# Patient Record
Sex: FEMALE | Race: BLACK OR AFRICAN AMERICAN | Employment: UNEMPLOYED | URBAN - METROPOLITAN AREA
[De-identification: names, ages, dates, MRNs, and addresses within clinical notes are randomized per-mention and may not be internally consistent; named-entity substitution may affect disease eponyms.]

---

## 2017-03-21 ENCOUNTER — TRANSFERRED RECORDS (OUTPATIENT)
Dept: HEALTH INFORMATION MANAGEMENT | Facility: CLINIC | Age: 32
End: 2017-03-21

## 2017-03-21 LAB
HPV ABSTRACT: NORMAL
PAP-ABSTRACT: NORMAL

## 2017-10-27 ENCOUNTER — APPOINTMENT (OUTPATIENT)
Dept: ULTRASOUND IMAGING | Facility: CLINIC | Age: 32
End: 2017-10-27
Attending: FAMILY MEDICINE
Payer: COMMERCIAL

## 2017-10-27 ENCOUNTER — HOSPITAL ENCOUNTER (EMERGENCY)
Facility: CLINIC | Age: 32
Discharge: HOME | End: 2017-10-27
Attending: FAMILY MEDICINE | Admitting: FAMILY MEDICINE
Payer: COMMERCIAL

## 2017-10-27 VITALS
SYSTOLIC BLOOD PRESSURE: 101 MMHG | TEMPERATURE: 98.2 F | HEART RATE: 71 BPM | BODY MASS INDEX: 31.32 KG/M2 | DIASTOLIC BLOOD PRESSURE: 68 MMHG | OXYGEN SATURATION: 99 % | RESPIRATION RATE: 16 BRPM | WEIGHT: 200 LBS

## 2017-10-27 DIAGNOSIS — O03.9 MISCARRIAGE: ICD-10-CM

## 2017-10-27 DIAGNOSIS — O03.4 RETAINED PRODUCTS OF CONCEPTION FOLLOWING ABORTION: ICD-10-CM

## 2017-10-27 LAB
B-HCG SERPL-ACNC: 2440 IU/L (ref 0–5)
BASOPHILS # BLD AUTO: 0 10E9/L (ref 0–0.2)
BASOPHILS NFR BLD AUTO: 0.4 %
DIFFERENTIAL METHOD BLD: NORMAL
EOSINOPHIL # BLD AUTO: 0.1 10E9/L (ref 0–0.7)
EOSINOPHIL NFR BLD AUTO: 1.5 %
ERYTHROCYTE [DISTWIDTH] IN BLOOD BY AUTOMATED COUNT: 13.7 % (ref 10–15)
HCG UR QL: POSITIVE
HCT VFR BLD AUTO: 36.9 % (ref 35–47)
HGB BLD-MCNC: 12.1 G/DL (ref 11.7–15.7)
IMM GRANULOCYTES # BLD: 0 10E9/L (ref 0–0.4)
IMM GRANULOCYTES NFR BLD: 0.2 %
INTERNAL QC OK POCT: YES
LYMPHOCYTES # BLD AUTO: 2.4 10E9/L (ref 0.8–5.3)
LYMPHOCYTES NFR BLD AUTO: 44 %
MCH RBC QN AUTO: 28.2 PG (ref 26.5–33)
MCHC RBC AUTO-ENTMCNC: 32.8 G/DL (ref 31.5–36.5)
MCV RBC AUTO: 86 FL (ref 78–100)
MONOCYTES # BLD AUTO: 0.3 10E9/L (ref 0–1.3)
MONOCYTES NFR BLD AUTO: 5.1 %
NEUTROPHILS # BLD AUTO: 2.7 10E9/L (ref 1.6–8.3)
NEUTROPHILS NFR BLD AUTO: 48.8 %
NRBC # BLD AUTO: 0 10*3/UL
NRBC BLD AUTO-RTO: 0 /100
PLATELET # BLD AUTO: 264 10E9/L (ref 150–450)
RBC # BLD AUTO: 4.29 10E12/L (ref 3.8–5.2)
WBC # BLD AUTO: 5.5 10E9/L (ref 4–11)

## 2017-10-27 PROCEDURE — 99284 EMERGENCY DEPT VISIT MOD MDM: CPT | Mod: Z6 | Performed by: FAMILY MEDICINE

## 2017-10-27 PROCEDURE — 84702 CHORIONIC GONADOTROPIN TEST: CPT | Performed by: FAMILY MEDICINE

## 2017-10-27 PROCEDURE — 81025 URINE PREGNANCY TEST: CPT | Performed by: FAMILY MEDICINE

## 2017-10-27 PROCEDURE — 85025 COMPLETE CBC W/AUTO DIFF WBC: CPT | Performed by: FAMILY MEDICINE

## 2017-10-27 PROCEDURE — 25000132 ZZH RX MED GY IP 250 OP 250 PS 637: Performed by: FAMILY MEDICINE

## 2017-10-27 PROCEDURE — 76801 OB US < 14 WKS SINGLE FETUS: CPT

## 2017-10-27 PROCEDURE — 99284 EMERGENCY DEPT VISIT MOD MDM: CPT | Mod: 25 | Performed by: FAMILY MEDICINE

## 2017-10-27 RX ORDER — OXYCODONE AND ACETAMINOPHEN 5; 325 MG/1; MG/1
1 TABLET ORAL ONCE
Status: COMPLETED | OUTPATIENT
Start: 2017-10-27 | End: 2017-10-27

## 2017-10-27 RX ORDER — OXYCODONE AND ACETAMINOPHEN 5; 325 MG/1; MG/1
1-2 TABLET ORAL EVERY 4 HOURS PRN
Qty: 10 TABLET | Refills: 0 | Status: SHIPPED | OUTPATIENT
Start: 2017-10-27

## 2017-10-27 RX ADMIN — OXYCODONE HYDROCHLORIDE AND ACETAMINOPHEN 1 TABLET: 5; 325 TABLET ORAL at 20:46

## 2017-10-27 ASSESSMENT — ENCOUNTER SYMPTOMS
HEADACHES: 0
EYE REDNESS: 0
COLOR CHANGE: 0
CONFUSION: 0
DIFFICULTY URINATING: 0
ARTHRALGIAS: 0
NECK STIFFNESS: 0
SHORTNESS OF BREATH: 0
FEVER: 0
ABDOMINAL PAIN: 1

## 2017-10-27 NOTE — ED PROVIDER NOTES
History     Chief Complaint   Patient presents with     Vaginal Bleeding     Onset today with vaginal bleeding, 2 months pregnant.     KHANG Madera is a 32 year old female who is  currently 2 months gestation who presents for evaluation of abdominal pain and vaginal bleeding. Patient complains of periumbilical abdominal pain as well as vaginal bleeding described as spotting that began today. She also reports some mild back pain. She denies chest pain, shortness of breath, fever, cough, or URI symptoms. No numbness or weakness. Her last pregnancy was in 2016, and she did have a baby born at that time. No history of diabetes or hypertension. She has not yet been seen or evaluated by an OB for this pregnancy, but reports she does have an appointment scheduled.     I have reviewed the Medications, Allergies, Past Medical and Surgical History, and Social History in the [employer] system.  Past Medical History:   Diagnosis Date     NO ACTIVE PROBLEMS      Pneumonia        Past Surgical History:   Procedure Laterality Date     No Surgical History         No family history on file.    Social History   Substance Use Topics     Smoking status: Never Smoker     Smokeless tobacco: Never Used     Alcohol use No       No current facility-administered medications for this encounter.      Current Outpatient Prescriptions   Medication     oxyCODONE-acetaminophen (PERCOCET) 5-325 MG per tablet     acetaminophen (TYLENOL) 500 MG tablet     polyvinyl alcohol (LIQUIFILM TEARS) 1.4 % ophthalmic solution     Prenatal Vit-Fe Fumarate-FA (PRENATAL MULTIVITAMIN  PLUS IRON) 27-0.8 MG TABS     ranitidine (ZANTAC) 150 MG tablet      No Known Allergies    Review of Systems   Constitutional: Negative for fever.   HENT: Negative for congestion.    Eyes: Negative for redness.   Respiratory: Negative for shortness of breath.    Cardiovascular: Negative for chest pain.   Gastrointestinal: Positive for abdominal pain (mild  periumbilical).   Genitourinary: Positive for vaginal bleeding (spotting x1 day). Negative for difficulty urinating.   Musculoskeletal: Negative for arthralgias and neck stiffness.   Skin: Negative for color change.   Neurological: Negative for headaches.   Psychiatric/Behavioral: Negative for confusion.   All other systems reviewed and are negative.      Physical Exam   BP: 118/62  Pulse: 87  Heart Rate: 87  Temp: 98.2  F (36.8  C)  Resp: 16  Weight: 90.7 kg (200 lb)  SpO2: 100 %      Physical Exam   Constitutional: She is oriented to person, place, and time. No distress.   HENT:   Head: Atraumatic.   Mouth/Throat: Oropharynx is clear and moist. No oropharyngeal exudate.   Eyes: Pupils are equal, round, and reactive to light. No scleral icterus.   Cardiovascular: Normal heart sounds and intact distal pulses.    Pulmonary/Chest: Breath sounds normal. No respiratory distress.   Abdominal: Soft. Bowel sounds are normal. There is no tenderness.   Genitourinary: There is breast bleeding. Uterus is enlarged and tender. Cervix exhibits no motion tenderness. Right adnexum displays no mass, no tenderness and no fullness. Left adnexum displays no mass, no tenderness and no fullness. There is bleeding in the vagina.   Musculoskeletal: She exhibits no edema or tenderness.   Neurological: She is alert and oriented to person, place, and time. She has normal reflexes.   Skin: Skin is warm. No rash noted. She is not diaphoretic.       ED Course     ED Course     Procedures   5:31 PM  The patient was seen and examined by Dr. Crandall in Room 19.          Critical Care time:  none     Labs/Imaging:    Results for orders placed or performed during the hospital encounter of 10/27/17 (from the past 24 hour(s))   CBC with platelets differential   Result Value Ref Range    WBC 5.5 4.0 - 11.0 10e9/L    RBC Count 4.29 3.8 - 5.2 10e12/L    Hemoglobin 12.1 11.7 - 15.7 g/dL    Hematocrit 36.9 35.0 - 47.0 %    MCV 86 78 - 100 fl    MCH  28.2 26.5 - 33.0 pg    MCHC 32.8 31.5 - 36.5 g/dL    RDW 13.7 10.0 - 15.0 %    Platelet Count 264 150 - 450 10e9/L    Diff Method Automated Method     % Neutrophils 48.8 %    % Lymphocytes 44.0 %    % Monocytes 5.1 %    % Eosinophils 1.5 %    % Basophils 0.4 %    % Immature Granulocytes 0.2 %    Nucleated RBCs 0 0 /100    Absolute Neutrophil 2.7 1.6 - 8.3 10e9/L    Absolute Lymphocytes 2.4 0.8 - 5.3 10e9/L    Absolute Monocytes 0.3 0.0 - 1.3 10e9/L    Absolute Eosinophils 0.1 0.0 - 0.7 10e9/L    Absolute Basophils 0.0 0.0 - 0.2 10e9/L    Abs Immature Granulocytes 0.0 0 - 0.4 10e9/L    Absolute Nucleated RBC 0.0    HCG quantitative pregnancy (blood)   Result Value Ref Range    HCG Quantitative Serum 2440 (H) 0 - 5 IU/L   hCG qual urine POCT   Result Value Ref Range    HCG Qual Urine Positive neg    Internal QC OK Yes    OB  US 1st trimester w transvag    Narrative    US OB <14 WEEKS WITH TRANSVAGINAL SINGLE 10/27/2017 6:40 PM     COMPARISON: None    HISTORY: Bleeding and cramping.    TECHNIQUE: Transabdominal ultrasound was acquired initially.  Transvaginal ultrasound was performed for better evaluation of the  products of conception.    FINDINGS: There are intrauterine products of conception including  gestational sac and embryo. The products of conception are located in  the lower aspect of the endometrial cavity. Mean crown-rump length of  the embryo is 19 mm corresponding to ultrasound gestational age of 8  weeks 3 days. There is no fetal cardiac motion or heart rate.    There is no evidence for subchorionic hemorrhage.    The right ovary is not visualized. The left ovary measures 1.9 x 2.1 x  1.0 cm. The left ovary is unremarkable.    There are no adnexal masses. There is a tiny amount of free fluid in  the cul-de-sac, minimal internal echoes possibly representing  hemorrhage.      Impression    IMPRESSION:  1. Intrauterine products of conception consisting of a gestational sac  and an embryo. The lack of  embryonic cardiac activity or an embryonic  heart rate and the location of the products of conception in the lower  uterine segment, consistent with fetal demise and AB in progress.  2. Small amount of free fluid in the cul-de-sac may represent a small  amount of hemorrhage.    TIFFANEI WEAVER MD       Assessments & Plan (with Medical Decision Making)       I have reviewed the nursing notes.    I have reviewed the findings, diagnosis, plan and need for follow up with the patient.  Patient with ongoing vaginal bleeding and ultrasound demonstrating 8 week pregnancy with no cardiac activity patient appears to be having miscarriage at this time is opting for conservative management at home she'll return if she has a marked increase in bleeding or pain.      Discharge Medication List as of 10/27/2017  9:55 PM      START taking these medications    Details   oxyCODONE-acetaminophen (PERCOCET) 5-325 MG per tablet Take 1-2 tablets by mouth every 4 hours as needed for pain, Disp-10 tablet, R-0, Local Print             Final diagnoses:   Miscarriage   I, Lisa Rios, am serving as a trained medical scribe to document services personally performed by Korey Crandall MD, based on the provider's statements to me.   I, Korey Crandall MD, was physically present and have reviewed and verified the accuracy of this note documented by Lisa Rios.      10/27/2017   [facility], [place], EMERGENCY DEPARTMENT     Korey Crandall MD  10/28/17 0018

## 2017-10-27 NOTE — ED AVS SNAPSHOT
[facility], [place], Emergency Department    [address]    [place] [address]    Phone:  [phone]    Fax:  [phone]                                       Miguel Madera   MRN: [id number]    Department:  [facility], Emergency Department   Date of Visit:  10/27/2017           After Visit Summary Signature Page     I have received my discharge instructions, and my questions have been answered. I have discussed any challenges I see with this plan with the nurse or doctor.    ..........................................................................................................................................  Patient/Patient Representative Signature      ..........................................................................................................................................  Patient Representative Print Name and Relationship to Patient    ..................................................               ................................................  Date                                            Time    ..........................................................................................................................................  Reviewed by Signature/Title    ...................................................              ..............................................  Date                                                            Time

## 2017-10-27 NOTE — ED AVS SNAPSHOT
[facility], Emergency Department    [address]    [place] [address]    Phone:  [phone]    Fax:  [phone]                                       Miguel Madera   MRN: [id number]    Department:  [facility], Emergency Department   Date of Visit:  10/27/2017           Patient Information     Date Of Birth          1985        Your diagnoses for this visit were:     Miscarriage        You were seen by Korey Crandall MD.      Follow-up Information     Follow up with [facility].    Contact information:    [address]  [facility] [address]  [phone]          Discharge Instructions         Understanding Miscarriage: During a Miscarriage  No two miscarriages are alike. Because of this, your healthcare provider will talk with you about the most appropriate treatment for you. If you re in good health and early in the pregnancy, your body may expel all the pregnancy tissue on its own. If your body doesn t expel all the tissue, your healthcare provider may recommend treatment to prevent infection and severe bleeding (hemorrhaging).  What happens during miscarriage  Some miscarriages happen without any signs or symptoms. Most miscarriages, however, start with bleeding and cramping, which may increase over time. The cramps may get very strong. This is normal when a miscarriage is happening. Cramping widens the passage (cervix) that tissue from the uterus must pass through to leave your body. Your healthcare provider may ask you for a sample of the tissue for lab testing. This is to make sure that the cells being shed from your body are normal.  Diagnosis  To confirm the miscarriage, your healthcare provider will do a pelvic exam. Your healthcare provider might order a blood test to measure the levels of a pregnancy hormone (hCG).  He or she may also have you get an ultrasound test to find out if all of the tissue has passed from the uterus. If a miscarriage  happens very early in the pregnancy, an ultrasound is not needed.  Treatment  If any tissue remains in the uterus, your healthcare provider may suggest the following measures depending on your particular situation:    Medicine. This is prescribed for you to take at home. The medicine causes the uterus to expel any remaining tissue. Take the medicine exactly as directed.    Dilation and curettage (D & C). This procedure is done in your healthcare provider s office or at the hospital. You are given medicine to prevent pain or to allow you to relax or sleep during the procedure. The healthcare provider uses instruments to widen the cervix (dilate). Tissue and blood that line the uterus are then removed (curettage).  Be sure you talk to your healthcare provider about the risks and benefits of these treatments.  If you have Rh-negative blood  If your blood is Rh negative, you may need treatment with Rho(D) immune globulin. This injection prevents substances in your blood from attacking the baby s blood during a future pregnancy. Your healthcare provider can tell you more.   Follow-up care  Keep all follow-up appointments. These are needed to make sure that you are healing well. During these visits, mention if you re feeling very sad or depressed. Your healthcare provider can suggest counseling or other resources to help you.  When to seek medical care  Contact your healthcare provider if you have any of the following:    Severe pain in the stomach, pelvis, or low back    Vaginal discharge that has a bad odor    Bleeding that soaks a new sanitary pad each hour    Fever of 100.4 F (38 C) or higher, or as directed by your healthcare provider   Date Last Reviewed: 6/1/2016 2000-2017 [employer]. [address] [place] [address], [place] [address]. All rights reserved. This information is not intended as a substitute for professional medical care. Always follow your healthcare professional's instructions.          24  Hour Appointment Hotline       To make an appointment at any [facility], call [phone] ([phone]). If you don't have a family doctor or clinic, we will help you find one. [facility] clinics are conveniently located to serve the needs of you and your family.             Review of your medicines      START taking        Dose / Directions Last dose taken    oxyCODONE-acetaminophen 5-325 MG per tablet   Commonly known as:  PERCOCET   Dose:  1-2 tablet   Quantity:  10 tablet        Take 1-2 tablets by mouth every 4 hours as needed for pain   Refills:  0          Our records show that you are taking the medicines listed below. If these are incorrect, please call your family doctor or clinic.        Dose / Directions Last dose taken    acetaminophen 500 MG tablet   Commonly known as:  TYLENOL   Dose:  500-1000 mg   Quantity:  30 tablet        Take 1-2 tablets (500-1,000 mg) by mouth every 6 hours as needed for mild pain   Refills:  0        polyvinyl alcohol 1.4 % ophthalmic solution   Commonly known as:  LIQUIFILM TEARS   Dose:  1 drop   Quantity:  15 mL        Apply 1 drop to eye as needed for dry eyes Every hour as needed for itching in the eyes   Refills:  0        prenatal multivitamin plus iron 27-0.8 MG Tabs per tablet   Dose:  1 tablet   Quantity:  60 tablet        Take 1 tablet by mouth daily   Refills:  0        ranitidine 150 MG tablet   Commonly known as:  ZANTAC   Dose:  150 mg   Quantity:  60 tablet        Take 1 tablet (150 mg) by mouth 2 times daily   Refills:  0                Prescriptions were sent or printed at these locations (1 Prescription)                   Other Prescriptions                Printed at Department/Unit printer (1 of 1)         oxyCODONE-acetaminophen (PERCOCET) 5-325 MG per tablet                Procedures and tests performed during your visit     CBC with platelets differential    HCG quantitative pregnancy (blood)    OB  US 1st trimester w transvag    Saline Lock IV  "   hCG qual urine POCT      Orders Needing Specimen Collection     None      Pending Results     No orders found from 10/25/2017 to 10/28/2017.            Pending Culture Results     No orders found from 10/25/2017 to 10/28/2017.            Pending Results Instructions     If you had any lab results that were not finalized at the time of your Discharge, you can call the ED Lab Result RN at [phone]. You will be contacted by this team for any positive Lab results or changes in treatment. The nurses are available 7 days a week from 10A to 6:30P.  You can leave a message 24 hours per day and they will return your call.        Thank you for choosing [facility]       Thank you for choosing [facility] for your care. Our goal is always to provide you with excellent care. Hearing back from our patients is one way we can continue to improve our services. Please take a few minutes to complete the written survey that you may receive in the mail after you visit with us. Thank you!        [employer] Information     [employer] lets you send messages to your doctor, view your test results, renew your prescriptions, schedule appointments and more. To sign up, go to [email or web address] . Click on \"Log in\" on the left side of the screen, which will take you to the Welcome page. Then click on \"Sign up Now\" on the right side of the page.     You will be asked to enter the access code listed below, as well as some personal information. Please follow the directions to create your username and password.     Your access code is: [email or web address]  Expires: 2018  9:54 PM     Your access code will  in 90 days. If you need help or a new code, please call your [facility] clinic or [phone].        Care EveryWhere ID     This is your Care EveryWhere ID. This could be used by other organizations to access your [facility] medical records  [id number]        Equal Access to Services     SYDNEE FINK: aniceto Wang " franchesca garza waxay idiin hayaan adeeg kharash la'aan ah. So [facility] [phone].    ATENCIÓN: Si habla colten, tiene a quiroga disposición servicios gratuitos de asistencia lingüística. Llame al [phone].    We comply with applicable federal civil rights laws and [place] laws. We do not discriminate on the basis of race, color, national origin, age, disability, sex, sexual orientation, or gender identity.            After Visit Summary       This is your record. Keep this with you and show to your community pharmacist(s) and doctor(s) at your next visit.

## 2017-10-28 NOTE — PROGRESS NOTES
GYN ED Note     Consulted by Dr. Crandall to  patient with ultrasound diagnosed missed  on her management options.  Dr. Crandall was informed of the delayed time frame of our team being able to come see the patient due to a patient in active labor with imminent delivery.  He reported that on exam the patient had minimal bleeding and her abdominal pain was improved with percocet.  Vitals and Hgb WNL.  GYN team proceeded to ED immediately following delivery.  When I arrived to the room the patient stated that she planned to go home right now and wanted to watch and wait.  She plans to eat honey and drink milk to help with the miscarriage process.  I explained to her that expectant management was a reasonable option.  I also informed her of the option of medical vs. Surgical management as well.  She voices understanding. Patient given bleeding and return precautions.  Discussed conversation with Dr. Crandall.       Mary Kay Pineda MD  OBGYN PGY3

## 2017-10-28 NOTE — DISCHARGE INSTRUCTIONS
Understanding Miscarriage: During a Miscarriage  No two miscarriages are alike. Because of this, your healthcare provider will talk with you about the most appropriate treatment for you. If you re in good health and early in the pregnancy, your body may expel all the pregnancy tissue on its own. If your body doesn t expel all the tissue, your healthcare provider may recommend treatment to prevent infection and severe bleeding (hemorrhaging).  What happens during miscarriage  Some miscarriages happen without any signs or symptoms. Most miscarriages, however, start with bleeding and cramping, which may increase over time. The cramps may get very strong. This is normal when a miscarriage is happening. Cramping widens the passage (cervix) that tissue from the uterus must pass through to leave your body. Your healthcare provider may ask you for a sample of the tissue for lab testing. This is to make sure that the cells being shed from your body are normal.  Diagnosis  To confirm the miscarriage, your healthcare provider will do a pelvic exam. Your healthcare provider might order a blood test to measure the levels of a pregnancy hormone (hCG).  He or she may also have you get an ultrasound test to find out if all of the tissue has passed from the uterus. If a miscarriage happens very early in the pregnancy, an ultrasound is not needed.  Treatment  If any tissue remains in the uterus, your healthcare provider may suggest the following measures depending on your particular situation:    Medicine. This is prescribed for you to take at home. The medicine causes the uterus to expel any remaining tissue. Take the medicine exactly as directed.    Dilation and curettage (D & C). This procedure is done in your healthcare provider s office or at the hospital. You are given medicine to prevent pain or to allow you to relax or sleep during the procedure. The healthcare provider uses instruments to widen the cervix (dilate). Tissue  and blood that line the uterus are then removed (curettage).  Be sure you talk to your healthcare provider about the risks and benefits of these treatments.  If you have Rh-negative blood  If your blood is Rh negative, you may need treatment with Rho(D) immune globulin. This injection prevents substances in your blood from attacking the baby s blood during a future pregnancy. Your healthcare provider can tell you more.   Follow-up care  Keep all follow-up appointments. These are needed to make sure that you are healing well. During these visits, mention if you re feeling very sad or depressed. Your healthcare provider can suggest counseling or other resources to help you.  When to seek medical care  Contact your healthcare provider if you have any of the following:    Severe pain in the stomach, pelvis, or low back    Vaginal discharge that has a bad odor    Bleeding that soaks a new sanitary pad each hour    Fever of 100.4 F (38 C) or higher, or as directed by your healthcare provider   Date Last Reviewed: 6/1/2016 2000-2017 [employer]. [address] [place] [address], [place] [address]. All rights reserved. This information is not intended as a substitute for professional medical care. Always follow your healthcare professional's instructions.

## 2017-11-01 ENCOUNTER — HOSPITAL ENCOUNTER (EMERGENCY)
Facility: CLINIC | Age: 32
Discharge: HOME | End: 2017-11-02
Attending: EMERGENCY MEDICINE | Admitting: EMERGENCY MEDICINE
Payer: COMMERCIAL

## 2017-11-01 DIAGNOSIS — K59.00 CONSTIPATION, UNSPECIFIED CONSTIPATION TYPE: ICD-10-CM

## 2017-11-01 DIAGNOSIS — R10.32 LLQ ABDOMINAL PAIN: ICD-10-CM

## 2017-11-01 LAB
ALBUMIN SERPL-MCNC: 3.2 G/DL (ref 3.4–5)
ALBUMIN UR-MCNC: NEGATIVE MG/DL
ALP SERPL-CCNC: 57 U/L (ref 40–150)
ALT SERPL W P-5'-P-CCNC: 14 U/L (ref 0–50)
ANION GAP SERPL CALCULATED.3IONS-SCNC: 5 MMOL/L (ref 3–14)
APPEARANCE UR: CLEAR
AST SERPL W P-5'-P-CCNC: 12 U/L (ref 0–45)
B-HCG SERPL-ACNC: 73 IU/L (ref 0–5)
BASOPHILS # BLD AUTO: 0 10E9/L (ref 0–0.2)
BASOPHILS NFR BLD AUTO: 0.2 %
BILIRUB SERPL-MCNC: 0.2 MG/DL (ref 0.2–1.3)
BILIRUB UR QL STRIP: NEGATIVE
BUN SERPL-MCNC: 14 MG/DL (ref 7–30)
CALCIUM SERPL-MCNC: 8.3 MG/DL (ref 8.5–10.1)
CHLORIDE SERPL-SCNC: 109 MMOL/L (ref 94–109)
CO2 SERPL-SCNC: 28 MMOL/L (ref 20–32)
COLOR UR AUTO: YELLOW
CREAT SERPL-MCNC: 0.55 MG/DL (ref 0.52–1.04)
DIFFERENTIAL METHOD BLD: NORMAL
EOSINOPHIL # BLD AUTO: 0.1 10E9/L (ref 0–0.7)
EOSINOPHIL NFR BLD AUTO: 1.4 %
ERYTHROCYTE [DISTWIDTH] IN BLOOD BY AUTOMATED COUNT: 13.6 % (ref 10–15)
GFR SERPL CREATININE-BSD FRML MDRD: >90 ML/MIN/1.7M2
GLUCOSE SERPL-MCNC: 86 MG/DL (ref 70–99)
GLUCOSE UR STRIP-MCNC: NEGATIVE MG/DL
HCT VFR BLD AUTO: 35.3 % (ref 35–47)
HGB BLD-MCNC: 11.7 G/DL (ref 11.7–15.7)
HGB UR QL STRIP: NEGATIVE
IMM GRANULOCYTES # BLD: 0 10E9/L (ref 0–0.4)
IMM GRANULOCYTES NFR BLD: 0.2 %
KETONES UR STRIP-MCNC: NEGATIVE MG/DL
LEUKOCYTE ESTERASE UR QL STRIP: NEGATIVE
LYMPHOCYTES # BLD AUTO: 3 10E9/L (ref 0.8–5.3)
LYMPHOCYTES NFR BLD AUTO: 48 %
MCH RBC QN AUTO: 28.9 PG (ref 26.5–33)
MCHC RBC AUTO-ENTMCNC: 33.1 G/DL (ref 31.5–36.5)
MCV RBC AUTO: 87 FL (ref 78–100)
MONOCYTES # BLD AUTO: 0.3 10E9/L (ref 0–1.3)
MONOCYTES NFR BLD AUTO: 4.7 %
MUCOUS THREADS #/AREA URNS LPF: PRESENT /LPF
NEUTROPHILS # BLD AUTO: 2.8 10E9/L (ref 1.6–8.3)
NEUTROPHILS NFR BLD AUTO: 45.5 %
NITRATE UR QL: NEGATIVE
NRBC # BLD AUTO: 0 10*3/UL
NRBC BLD AUTO-RTO: 0 /100
PH UR STRIP: 6 PH (ref 5–7)
PLATELET # BLD AUTO: 251 10E9/L (ref 150–450)
POTASSIUM SERPL-SCNC: 3.4 MMOL/L (ref 3.4–5.3)
PROT SERPL-MCNC: 7.1 G/DL (ref 6.8–8.8)
RBC # BLD AUTO: 4.05 10E12/L (ref 3.8–5.2)
RBC #/AREA URNS AUTO: 1 /HPF (ref 0–2)
SODIUM SERPL-SCNC: 142 MMOL/L (ref 133–144)
SOURCE: ABNORMAL
SP GR UR STRIP: 1.02 (ref 1–1.03)
SPECIMEN SOURCE: NORMAL
SQUAMOUS #/AREA URNS AUTO: 2 /HPF (ref 0–1)
UROBILINOGEN UR STRIP-MCNC: NORMAL MG/DL (ref 0–2)
WBC # BLD AUTO: 6.2 10E9/L (ref 4–11)
WBC #/AREA URNS AUTO: <1 /HPF (ref 0–2)
WET PREP SPEC: NORMAL

## 2017-11-01 PROCEDURE — 84702 CHORIONIC GONADOTROPIN TEST: CPT | Performed by: EMERGENCY MEDICINE

## 2017-11-01 PROCEDURE — 99284 EMERGENCY DEPT VISIT MOD MDM: CPT | Performed by: EMERGENCY MEDICINE

## 2017-11-01 PROCEDURE — 81001 URINALYSIS AUTO W/SCOPE: CPT | Performed by: EMERGENCY MEDICINE

## 2017-11-01 PROCEDURE — 87591 N.GONORRHOEAE DNA AMP PROB: CPT | Performed by: EMERGENCY MEDICINE

## 2017-11-01 PROCEDURE — 87491 CHLMYD TRACH DNA AMP PROBE: CPT | Performed by: EMERGENCY MEDICINE

## 2017-11-01 PROCEDURE — 85025 COMPLETE CBC W/AUTO DIFF WBC: CPT | Performed by: EMERGENCY MEDICINE

## 2017-11-01 PROCEDURE — 87210 SMEAR WET MOUNT SALINE/INK: CPT | Performed by: EMERGENCY MEDICINE

## 2017-11-01 PROCEDURE — 80053 COMPREHEN METABOLIC PANEL: CPT | Performed by: EMERGENCY MEDICINE

## 2017-11-01 PROCEDURE — 99284 EMERGENCY DEPT VISIT MOD MDM: CPT | Mod: Z6 | Performed by: EMERGENCY MEDICINE

## 2017-11-01 RX ORDER — ASPIRIN 81 MG
100 TABLET, DELAYED RELEASE (ENTERIC COATED) ORAL 2 TIMES DAILY PRN
Qty: 60 TABLET | Refills: 0 | Status: SHIPPED | OUTPATIENT
Start: 2017-11-01

## 2017-11-01 ASSESSMENT — ENCOUNTER SYMPTOMS
LIGHT-HEADEDNESS: 1
CONSTIPATION: 0
DIARRHEA: 0
ABDOMINAL PAIN: 1
SHORTNESS OF BREATH: 0
VOMITING: 0
DYSURIA: 0
FEVER: 0
HEMATURIA: 0

## 2017-11-01 NOTE — ED AVS SNAPSHOT
[facility], [place], Emergency Department    [address]    [facility] [place] [place] [address]    Phone:  [phone]    Fax:  [phone]                                       Miguel Madera   MRN: [id number]    Department:  [facility], Emergency Department   Date of Visit:  11/1/2017           After Visit Summary Signature Page     I have received my discharge instructions, and my questions have been answered. I have discussed any challenges I see with this plan with the nurse or doctor.    ..........................................................................................................................................  Patient/Patient Representative Signature      ..........................................................................................................................................  Patient Representative Print Name and Relationship to Patient    ..................................................               ................................................  Date                                            Time    ..........................................................................................................................................  Reviewed by Signature/Title    ...................................................              ..............................................  Date                                                            Time

## 2017-11-01 NOTE — ED AVS SNAPSHOT
[facility], Emergency Department    [address]    [place] [address]    Phone:  [phone]    Fax:  [phone]                                       Miguel Madera   MRN: [id number]    Department:  [facility] Emergency Department   Date of Visit:  11/1/2017           Patient Information     Date Of Birth          1985        Your diagnoses for this visit were:     LLQ abdominal pain     Constipation, unspecified constipation type        You were seen by Dhaval Reyna MD.        Discharge Instructions         Constipation (Adult)  Constipation means that you have bowel movements that are less frequent than usual. Stools often become very hard and difficult to pass.  Constipation is very common. At some point in life it affects almost everyone. Since everyone's bowel habits are different, what is constipation to one person may not be to another. Your healthcare provider may do tests to diagnose constipation. It depends on what he or she finds when evaluating you.    Symptoms of constipation include:    Abdominal pain    Bloating    Vomiting    Painful bowel movements    Itching, swelling, bleeding, or pain around the anus  Causes  Constipation can have many causes. These include:    Diet low in fiber    Too much dairy    Not drinking enough liquids    Lack of exercise or physical activity. This is especially true for older adults.    Changes in lifestyle or daily routine, including pregnancy, aging, work, and travel    Frequent use or misuse of laxatives    Ignoring the urge to have a bowel movement or delaying it until later    Medicines, such as certain prescription pain medicines, iron supplements, antacids, certain antidepressants, and calcium supplements    Diseases like irritable bowel syndrome, bowel obstructions, stroke, diabetes, thyroid disease, Parkinson disease, hemorrhoids, and colon cancer  Complications  Potential complications of constipation can  include:    Hemorrhoids    Rectal bleeding from hemorrhoids or anal fissures (skin tears)    Hernias    Dependency on laxatives    Chronic constipation    Fecal impaction    Bowel obstruction or perforation  Home care  All treatment should be done after talking with your healthcare provider. This is especially true if you have another medical problems, are taking prescription medicines, or are an older adult. Treatment most often involves lifestyle changes. You may also need medicines. Your healthcare provider will tell you which will work best for you. Follow the advice below to help avoid this problem in the future.  Lifestyle changes  These lifestyle changes can help prevent constipation:    Diet. Eat a high-fiber diet, with fresh fruit and vegetables, and reduce dairy intake, meats, and processed foods    Fluids. It's important to get enough fluids each day. Drink plenty of water when you eat more fiber. If you are on diet that limits the amount of fluid you can have, talk about this with your healthcare provider.    Regular exercise. Check with your healthcare provider first.  Medications  Take any medicines as directed. Some laxatives are safe to use only every now and then. Others can be taken on a regular basis. Talk with your doctor or pharmacist if you have questions.  Prescription pain medicines can cause constipation. If you are taking this kind of medicine, ask your healthcare provider if you should also take a stool softener.  Medicines you may take to treat constipation include:    Fiber supplements    Stool softeners    Laxatives    Enemas    Rectal suppositories  Follow-up care  Follow up with your healthcare provider if symptoms don't get better in the next few days. You may need to have more tests or see a specialist.  Call 911  Call 911 if any of these occur:    Trouble breathing    Stiff, rigid abdomen that is severely painful to touch    Confusion    Fainting or loss of consciousness    Rapid  heart rate    Chest pain  When to seek medical advice  Call your healthcare provider right away if any of these occur:    Fever over 100.4 F (38 C)    Failure to resume normal bowel movements    Pain in your abdomen or back gets worse    Nausea or vomiting    Swelling in your abdomen    Blood in the stool    Black, tarry stool    Involuntary weight loss    Weakness  Date Last Reviewed: 12/30/2015 2000-2017 [employer]. [address] [place] [address]. All rights reserved. This information is not intended as a substitute for professional medical care. Always follow your healthcare professional's instructions.      Take colace and citrucel as directed.  Please make an appointment to follow up with Your Primary Care Provider in 1 week.     24 Hour Appointment Hotline       To make an appointment at any [facility], call [phone] ([phone]). If you don't have a family doctor or clinic, we will help you find one. [place] clinics are conveniently located to serve the needs of you and your family.             Review of your medicines      START taking        Dose / Directions Last dose taken    docusate sodium 100 MG tablet   Commonly known as:  COLACE   Dose:  100 mg   Quantity:  60 tablet        Take 100 mg by mouth 2 times daily as needed for constipation   Refills:  0        methylcellulose 500 MG Tabs tablet   Commonly known as:  CITRUCEL   Dose:  500 mg   Quantity:  14 each        Take 1 tablet (500 mg) by mouth daily   Refills:  0          Our records show that you are taking the medicines listed below. If these are incorrect, please call your family doctor or clinic.        Dose / Directions Last dose taken    acetaminophen 500 MG tablet   Commonly known as:  TYLENOL   Dose:  500-1000 mg   Quantity:  30 tablet        Take 1-2 tablets (500-1,000 mg) by mouth every 6 hours as needed for mild pain   Refills:  0        oxyCODONE-acetaminophen 5-325 MG per tablet   Commonly  known as:  PERCOCET   Dose:  1-2 tablet   Quantity:  10 tablet        Take 1-2 tablets by mouth every 4 hours as needed for pain   Refills:  0        polyvinyl alcohol 1.4 % ophthalmic solution   Commonly known as:  LIQUIFILM TEARS   Dose:  1 drop   Quantity:  15 mL        Apply 1 drop to eye as needed for dry eyes Every hour as needed for itching in the eyes   Refills:  0        prenatal multivitamin plus iron 27-0.8 MG Tabs per tablet   Dose:  1 tablet   Quantity:  60 tablet        Take 1 tablet by mouth daily   Refills:  0        ranitidine 150 MG tablet   Commonly known as:  ZANTAC   Dose:  150 mg   Quantity:  60 tablet        Take 1 tablet (150 mg) by mouth 2 times daily   Refills:  0                Prescriptions were sent or printed at these locations (2 Prescriptions)                   Other Prescriptions                Printed at Department/Unit printer (2 of 2)         docusate sodium (COLACE) 100 MG tablet               methylcellulose (CITRUCEL) 500 MG TABS tablet                Procedures and tests performed during your visit     CBC with platelets differential    Chlamydia trachomatis PCR    Comprehensive metabolic panel    HCG quantitative pregnancy (blood)    Neisseria gonorrhoea PCR    Peripheral IV catheter    Prep for procedure - pelvic exam    UA with Microscopic reflex to Culture    Wet prep      Orders Needing Specimen Collection     None      Pending Results     Date and Time Order Name Status Description    11/1/2017 2212 Neisseria gonorrhoea PCR In process     11/1/2017 2212 Chlamydia trachomatis PCR In process             Pending Culture Results     Date and Time Order Name Status Description    11/1/2017 2212 Neisseria gonorrhoea PCR In process     11/1/2017 2212 Chlamydia trachomatis PCR In process             Pending Results Instructions     If you had any lab results that were not finalized at the time of your Discharge, you can call the ED Lab Result RN at [phone]. You will be  "contacted by this team for any positive Lab results or changes in treatment. The nurses are available 7 days a week from 10A to 6:30P.  You can leave a message 24 hours per day and they will return your call.        Thank you for choosing [facility]       Thank you for choosing [facility] for your care. Our goal is always to provide you with excellent care. Hearing back from our patients is one way we can continue to improve our services. Please take a few minutes to complete the written survey that you may receive in the mail after you visit with us. Thank you!        [employer] Information     [employer] lets you send messages to your doctor, view your test results, renew your prescriptions, schedule appointments and more. To sign up, go to [email or web address] . Click on \"Log in\" on the left side of the screen, which will take you to the Welcome page. Then click on \"Sign up Now\" on the right side of the page.     You will be asked to enter the access code listed below, as well as some personal information. Please follow the directions to create your username and password.     Your access code is: [email or web address]  Expires: 2018  9:54 PM     Your access code will  in 90 days. If you need help or a new code, please call your [facility] clinic or [phone].        Care EveryWhere ID     This is your Care EveryWhere ID. This could be used by other organizations to access your [facility] medical records  [id number]        Equal Access to Services     SYDNEE TATE AH: Hadii logan Sood, waaxda luqadaha, qaybta kaalmada binh, leah lacy . So [facility] [phone].    ATENCIÓN: Si habla español, tiene a quiroga disposición servicios gratuitos de asistencia lingüística. Llame al [phone].    We comply with applicable federal civil rights laws and [place] laws. We do not discriminate on the basis of race, color, national origin, age, disability, sex, sexual orientation, or gender " identity.            After Visit Summary       This is your record. Keep this with you and show to your community pharmacist(s) and doctor(s) at your next visit.

## 2017-11-02 VITALS
WEIGHT: 199.96 LBS | DIASTOLIC BLOOD PRESSURE: 70 MMHG | OXYGEN SATURATION: 100 % | BODY MASS INDEX: 31.38 KG/M2 | RESPIRATION RATE: 16 BRPM | HEIGHT: 67 IN | TEMPERATURE: 98.3 F | SYSTOLIC BLOOD PRESSURE: 105 MMHG

## 2017-11-02 LAB
C TRACH DNA SPEC QL NAA+PROBE: NEGATIVE
N GONORRHOEA DNA SPEC QL NAA+PROBE: NEGATIVE
SPECIMEN SOURCE: NORMAL
SPECIMEN SOURCE: NORMAL

## 2017-11-02 NOTE — DISCHARGE INSTRUCTIONS

## 2017-11-02 NOTE — ED PROVIDER NOTES
History     Chief Complaint   Patient presents with     Abdominal Pain     C/O LLQ pain. Pt states she has miscarriage.     KHANG Madera is a 32 year old female, , who presents to the ER today for evaluation of abdominal pain. The patient was seen in this ER on 10/27, 5 days ago, for evaluation of vaginal bleeding in early pregnancy. She underwent transvaginal ultrasound that demonstrated 8 week pregnancy with no cardiac activity. Symptoms at that time were felt consistent with active miscarriage. The patient reports that she did pass a significant amount of blood and presumed tissue material after discharged to home the night after her recent discharge. She had associated cramping abdominal pain that largely resolved; however, she has since had persisting, intermittent left lower quadrant pain. The patient reports that the pain has been intermittent, waxing and waning. She states that she's been trying oxycodone with limited effect in her pain. The patient identifies a localized area in the subcutaneous tissue in the left lower quadrant as the area of symptoms.  The patient states that vaginal bleeding has been very light over the past 2 days, noticed only when urinating. The patient reports some lightheadedness. She denies any vertiginous-type symptoms or any chest pain or shortness of breath. She also denies any fevers or chills. The patient also denies any constipation or diarrhea and states bowel movements have been regular. She denies recent falls or other trauma. The patient has no other complaints at this time.     No current facility-administered medications for this encounter.      Current Outpatient Prescriptions   Medication     docusate sodium (COLACE) 100 MG tablet     methylcellulose (CITRUCEL) 500 MG TABS tablet     oxyCODONE-acetaminophen (PERCOCET) 5-325 MG per tablet     acetaminophen (TYLENOL) 500 MG tablet     polyvinyl alcohol (LIQUIFILM TEARS) 1.4 % ophthalmic solution      "Prenatal Vit-Fe Fumarate-FA (PRENATAL MULTIVITAMIN  PLUS IRON) 27-0.8 MG TABS     ranitidine (ZANTAC) 150 MG tablet     Past Medical History:   Diagnosis Date     NO ACTIVE PROBLEMS      Pneumonia        Past Surgical History:   Procedure Laterality Date     No Surgical History         No family history on file.    Social History   Substance Use Topics     Smoking status: Never Smoker     Smokeless tobacco: Never Used     Alcohol use No      No Known Allergies    I have reviewed the Medications, Allergies, Past Medical and Surgical History, and Social History in the Epic system.    Review of Systems   Constitutional: Negative for fever.   Respiratory: Negative for shortness of breath.    Cardiovascular: Negative for chest pain.   Gastrointestinal: Positive for abdominal pain (LLQ). Negative for constipation, diarrhea and vomiting.   Genitourinary: Positive for vaginal bleeding (very mild). Negative for dysuria, hematuria and vaginal discharge.   Neurological: Positive for light-headedness (mild).   All other systems reviewed and are negative.      Physical Exam   BP: 98/64  Heart Rate: 92  Temp: 97.8  F (36.6  C)  Resp: 16  Height: 170.2 cm (5' 7\")  Weight: 90.7 kg (199 lb 15.3 oz)  SpO2: 98 %      Physical Exam   Constitutional: She appears well-developed and well-nourished. No distress.   HENT:   Head: Normocephalic and atraumatic.   Mouth/Throat: Oropharynx is clear and moist. No oropharyngeal exudate.   Eyes: Pupils are equal, round, and reactive to light. No scleral icterus.   Cardiovascular: Normal rate, regular rhythm, normal heart sounds and intact distal pulses.    Pulmonary/Chest: Effort normal and breath sounds normal. No respiratory distress.   Abdominal: Soft. Bowel sounds are normal. There is no tenderness.       Genitourinary: Uterus normal. Uterus is not enlarged and not tender. Cervix exhibits no motion tenderness. Right adnexum displays no mass and no tenderness. Left adnexum displays no mass and " no tenderness. There is bleeding (trace) in the vagina.   Musculoskeletal: Normal range of motion. She exhibits no edema or tenderness.   Skin: Skin is warm. No rash noted. She is not diaphoretic.   Nursing note and vitals reviewed.      ED Course     ED Course     Procedures            Critical Care time:    Results for orders placed or performed during the hospital encounter of 11/01/17   CBC with platelets differential   Result Value Ref Range    WBC 6.2 4.0 - 11.0 10e9/L    RBC Count 4.05 3.8 - 5.2 10e12/L    Hemoglobin 11.7 11.7 - 15.7 g/dL    Hematocrit 35.3 35.0 - 47.0 %    MCV 87 78 - 100 fl    MCH 28.9 26.5 - 33.0 pg    MCHC 33.1 31.5 - 36.5 g/dL    RDW 13.6 10.0 - 15.0 %    Platelet Count 251 150 - 450 10e9/L    Diff Method Automated Method     % Neutrophils 45.5 %    % Lymphocytes 48.0 %    % Monocytes 4.7 %    % Eosinophils 1.4 %    % Basophils 0.2 %    % Immature Granulocytes 0.2 %    Nucleated RBCs 0 0 /100    Absolute Neutrophil 2.8 1.6 - 8.3 10e9/L    Absolute Lymphocytes 3.0 0.8 - 5.3 10e9/L    Absolute Monocytes 0.3 0.0 - 1.3 10e9/L    Absolute Eosinophils 0.1 0.0 - 0.7 10e9/L    Absolute Basophils 0.0 0.0 - 0.2 10e9/L    Abs Immature Granulocytes 0.0 0 - 0.4 10e9/L    Absolute Nucleated RBC 0.0    Comprehensive metabolic panel   Result Value Ref Range    Sodium 142 133 - 144 mmol/L    Potassium 3.4 3.4 - 5.3 mmol/L    Chloride 109 94 - 109 mmol/L    Carbon Dioxide 28 20 - 32 mmol/L    Anion Gap 5 3 - 14 mmol/L    Glucose 86 70 - 99 mg/dL    Urea Nitrogen 14 7 - 30 mg/dL    Creatinine 0.55 0.52 - 1.04 mg/dL    GFR Estimate >90 >60 mL/min/1.7m2    GFR Estimate If Black >90 >60 mL/min/1.7m2    Calcium 8.3 (L) 8.5 - 10.1 mg/dL    Bilirubin Total 0.2 0.2 - 1.3 mg/dL    Albumin 3.2 (L) 3.4 - 5.0 g/dL    Protein Total 7.1 6.8 - 8.8 g/dL    Alkaline Phosphatase 57 40 - 150 U/L    ALT 14 0 - 50 U/L    AST 12 0 - 45 U/L   HCG quantitative pregnancy (blood)   Result Value Ref Range    HCG Quantitative Serum  73 (H) 0 - 5 IU/L   UA with Microscopic reflex to Culture   Result Value Ref Range    Color Urine Yellow     Appearance Urine Clear     Glucose Urine Negative NEG^Negative mg/dL    Bilirubin Urine Negative NEG^Negative    Ketones Urine Negative NEG^Negative mg/dL    Specific Gravity Urine 1.022 1.003 - 1.035    Blood Urine Negative NEG^Negative    pH Urine 6.0 5.0 - 7.0 pH    Protein Albumin Urine Negative NEG^Negative mg/dL    Urobilinogen mg/dL Normal 0.0 - 2.0 mg/dL    Nitrite Urine Negative NEG^Negative    Leukocyte Esterase Urine Negative NEG^Negative    Source Unspecified Urine     WBC Urine <1 0 - 2 /HPF    RBC Urine 1 0 - 2 /HPF    Squamous Epithelial /HPF Urine 2 (H) 0 - 1 /HPF    Mucous Urine Present (A) NEG^Negative /LPF   Wet prep   Result Value Ref Range    Specimen Description Cervix     Wet Prep Few  PMNs seen       Wet Prep No Trichomonas seen     Wet Prep No clue cells seen     Wet Prep No yeast seen                Assessments & Plan (with Medical Decision Making)   32 year old female 5 days status post spontaneous  at 8 weeks gestation to the emergency department with intermittent left lower quadrant abdominal pain.  The patient also provides a history of constipation.  She has been on Percocet during her miscarriage.  In the emergency department, the patient has normal vital signs.  She appears clinically well.  Her abdomen is soft and nontender.  She localizes a small subcutaneous nodule in the left lower quadrant as her area of pain.  Her skin appears normal in the area.  There is no fluctuance or significant palpable abnormality.  The patient's pelvic examination is normal.  I do not suspect retained products of conception in light of her normal abdominal and pelvic exam in the absence of significant vaginal bleeding.  Patient has normal labs including CBC.  Her urine does not appear infected.  Circumference metabolic panel is normal as well.  GC and chlamydia are pending.  The patient  appears clinically well and appropriate for outpatient management.  My clinical suspicion for ovarian pathology is low in light of her normal pelvic exam.  Additionally, suspicion for diverticulitis or other form of colitis low as well.  Suspect constipation is playing a role in her symptoms.  We'll discharge home with Colace and Citrucel.  Primary care follow-up recommended.    I have reviewed the nursing notes.    I have reviewed the findings, diagnosis, plan and need for follow up with the patient.    New Prescriptions    DOCUSATE SODIUM (COLACE) 100 MG TABLET    Take 100 mg by mouth 2 times daily as needed for constipation    METHYLCELLULOSE (CITRUCEL) 500 MG TABS TABLET    Take 1 tablet (500 mg) by mouth daily       Final diagnoses:   LLQ abdominal pain   Constipation, unspecified constipation type     I, Daniel Lopez, am serving as a trained medical scribe to document services personally performed by Dhaval Reyna MD, based on the provider's statements to me.      I, Dhaval Reyna MD, was physically present and have reviewed and verified the accuracy of this note documented by Daniel Lopez.    11/1/2017   [facility], EMERGENCY DEPARTMENT     Dhaval Reyna MD  11/01/17 9291

## 2018-01-14 ENCOUNTER — HOSPITAL ENCOUNTER (EMERGENCY)
Facility: CLINIC | Age: 33
Discharge: HOME | End: 2018-01-14
Attending: EMERGENCY MEDICINE | Admitting: EMERGENCY MEDICINE
Payer: COMMERCIAL

## 2018-01-14 VITALS
WEIGHT: 204.4 LBS | OXYGEN SATURATION: 100 % | TEMPERATURE: 98.7 F | RESPIRATION RATE: 18 BRPM | SYSTOLIC BLOOD PRESSURE: 97 MMHG | BODY MASS INDEX: 32.08 KG/M2 | HEIGHT: 67 IN | DIASTOLIC BLOOD PRESSURE: 64 MMHG

## 2018-01-14 DIAGNOSIS — R11.2 NON-INTRACTABLE VOMITING WITH NAUSEA, UNSPECIFIED VOMITING TYPE: ICD-10-CM

## 2018-01-14 DIAGNOSIS — K21.9 GASTROESOPHAGEAL REFLUX DISEASE, ESOPHAGITIS PRESENCE NOT SPECIFIED: ICD-10-CM

## 2018-01-14 DIAGNOSIS — Z33.1 PREGNANCY, INCIDENTAL: ICD-10-CM

## 2018-01-14 LAB
ALBUMIN UR-MCNC: 10 MG/DL
ANION GAP SERPL CALCULATED.3IONS-SCNC: 6 MMOL/L (ref 3–14)
APPEARANCE UR: CLEAR
BASOPHILS # BLD AUTO: 0 10E9/L (ref 0–0.2)
BASOPHILS NFR BLD AUTO: 0.2 %
BILIRUB UR QL STRIP: NEGATIVE
BUN SERPL-MCNC: 8 MG/DL (ref 7–30)
CALCIUM SERPL-MCNC: 8.6 MG/DL (ref 8.5–10.1)
CAOX CRY #/AREA URNS HPF: ABNORMAL /HPF
CHLORIDE SERPL-SCNC: 106 MMOL/L (ref 94–109)
CO2 SERPL-SCNC: 26 MMOL/L (ref 20–32)
COLOR UR AUTO: YELLOW
CREAT SERPL-MCNC: 0.44 MG/DL (ref 0.52–1.04)
DIFFERENTIAL METHOD BLD: NORMAL
EOSINOPHIL # BLD AUTO: 0 10E9/L (ref 0–0.7)
EOSINOPHIL NFR BLD AUTO: 0.7 %
ERYTHROCYTE [DISTWIDTH] IN BLOOD BY AUTOMATED COUNT: 13.7 % (ref 10–15)
GFR SERPL CREATININE-BSD FRML MDRD: >90 ML/MIN/1.7M2
GLUCOSE SERPL-MCNC: 95 MG/DL (ref 70–99)
GLUCOSE UR STRIP-MCNC: NEGATIVE MG/DL
HCG UR QL: POSITIVE
HCT VFR BLD AUTO: 38.7 % (ref 35–47)
HGB BLD-MCNC: 13 G/DL (ref 11.7–15.7)
HGB UR QL STRIP: NEGATIVE
IMM GRANULOCYTES # BLD: 0 10E9/L (ref 0–0.4)
IMM GRANULOCYTES NFR BLD: 0 %
INTERNAL QC OK POCT: YES
KETONES UR STRIP-MCNC: NEGATIVE MG/DL
LEUKOCYTE ESTERASE UR QL STRIP: NEGATIVE
LYMPHOCYTES # BLD AUTO: 2.4 10E9/L (ref 0.8–5.3)
LYMPHOCYTES NFR BLD AUTO: 40.6 %
MCH RBC QN AUTO: 29.1 PG (ref 26.5–33)
MCHC RBC AUTO-ENTMCNC: 33.6 G/DL (ref 31.5–36.5)
MCV RBC AUTO: 87 FL (ref 78–100)
MONOCYTES # BLD AUTO: 0.3 10E9/L (ref 0–1.3)
MONOCYTES NFR BLD AUTO: 5.6 %
MUCOUS THREADS #/AREA URNS LPF: PRESENT /LPF
NEUTROPHILS # BLD AUTO: 3.1 10E9/L (ref 1.6–8.3)
NEUTROPHILS NFR BLD AUTO: 52.9 %
NITRATE UR QL: NEGATIVE
NRBC # BLD AUTO: 0 10*3/UL
NRBC BLD AUTO-RTO: 0 /100
PH UR STRIP: 5.5 PH (ref 5–7)
PLATELET # BLD AUTO: 220 10E9/L (ref 150–450)
POTASSIUM SERPL-SCNC: 3.3 MMOL/L (ref 3.4–5.3)
RBC # BLD AUTO: 4.46 10E12/L (ref 3.8–5.2)
RBC #/AREA URNS AUTO: 1 /HPF (ref 0–2)
SODIUM SERPL-SCNC: 138 MMOL/L (ref 133–144)
SOURCE: ABNORMAL
SP GR UR STRIP: 1.02 (ref 1–1.03)
SQUAMOUS #/AREA URNS AUTO: 6 /HPF (ref 0–1)
UROBILINOGEN UR STRIP-MCNC: NORMAL MG/DL (ref 0–2)
WBC # BLD AUTO: 5.9 10E9/L (ref 4–11)
WBC #/AREA URNS AUTO: 1 /HPF (ref 0–2)

## 2018-01-14 PROCEDURE — 99284 EMERGENCY DEPT VISIT MOD MDM: CPT | Mod: 25 | Performed by: EMERGENCY MEDICINE

## 2018-01-14 PROCEDURE — 25000128 H RX IP 250 OP 636: Performed by: EMERGENCY MEDICINE

## 2018-01-14 PROCEDURE — 80048 BASIC METABOLIC PNL TOTAL CA: CPT | Performed by: EMERGENCY MEDICINE

## 2018-01-14 PROCEDURE — 85025 COMPLETE CBC W/AUTO DIFF WBC: CPT | Performed by: EMERGENCY MEDICINE

## 2018-01-14 PROCEDURE — 25000125 ZZHC RX 250: Performed by: EMERGENCY MEDICINE

## 2018-01-14 PROCEDURE — 25000132 ZZH RX MED GY IP 250 OP 250 PS 637: Performed by: EMERGENCY MEDICINE

## 2018-01-14 PROCEDURE — 96374 THER/PROPH/DIAG INJ IV PUSH: CPT | Performed by: EMERGENCY MEDICINE

## 2018-01-14 PROCEDURE — 96361 HYDRATE IV INFUSION ADD-ON: CPT | Performed by: EMERGENCY MEDICINE

## 2018-01-14 PROCEDURE — 81001 URINALYSIS AUTO W/SCOPE: CPT | Performed by: EMERGENCY MEDICINE

## 2018-01-14 PROCEDURE — 81025 URINE PREGNANCY TEST: CPT | Performed by: EMERGENCY MEDICINE

## 2018-01-14 PROCEDURE — 99284 EMERGENCY DEPT VISIT MOD MDM: CPT | Mod: Z6 | Performed by: EMERGENCY MEDICINE

## 2018-01-14 RX ORDER — ONDANSETRON 2 MG/ML
8 INJECTION INTRAMUSCULAR; INTRAVENOUS ONCE
Status: COMPLETED | OUTPATIENT
Start: 2018-01-14 | End: 2018-01-14

## 2018-01-14 RX ORDER — POTASSIUM CHLORIDE 750 MG/1
40 TABLET, EXTENDED RELEASE ORAL ONCE
Status: COMPLETED | OUTPATIENT
Start: 2018-01-14 | End: 2018-01-14

## 2018-01-14 RX ORDER — ACETAMINOPHEN 500 MG
500-1000 TABLET ORAL EVERY 6 HOURS PRN
Qty: 30 TABLET | Refills: 0 | Status: SHIPPED | OUTPATIENT
Start: 2018-01-14 | End: 2018-01-22

## 2018-01-14 RX ORDER — ONDANSETRON 8 MG/1
8 TABLET, ORALLY DISINTEGRATING ORAL EVERY 8 HOURS PRN
Qty: 12 TABLET | Refills: 0 | Status: SHIPPED | OUTPATIENT
Start: 2018-01-14 | End: 2018-01-17

## 2018-01-14 RX ADMIN — POTASSIUM CHLORIDE 40 MEQ: 750 TABLET, FILM COATED, EXTENDED RELEASE ORAL at 13:43

## 2018-01-14 RX ADMIN — LIDOCAINE HYDROCHLORIDE: 20 SOLUTION ORAL; TOPICAL at 12:37

## 2018-01-14 RX ADMIN — SODIUM CHLORIDE 1000 ML: 9 INJECTION, SOLUTION INTRAVENOUS at 12:25

## 2018-01-14 RX ADMIN — ONDANSETRON 8 MG: 2 INJECTION INTRAMUSCULAR; INTRAVENOUS at 12:33

## 2018-01-14 ASSESSMENT — ENCOUNTER SYMPTOMS
DYSURIA: 0
VOMITING: 1
SHORTNESS OF BREATH: 0
NAUSEA: 1
FEVER: 0
ABDOMINAL PAIN: 0

## 2018-01-14 NOTE — ED AVS SNAPSHOT
[facility], [place], Emergency Department    [address]    [place] [address]    Phone:  [phone]    Fax:  [phone]                                       Miguel Madera   MRN: [id number]    Department:  [facility], Emergency Department   Date of Visit:  1/14/2018           After Visit Summary Signature Page     I have received my discharge instructions, and my questions have been answered. I have discussed any challenges I see with this plan with the nurse or doctor.    ..........................................................................................................................................  Patient/Patient Representative Signature      ..........................................................................................................................................  Patient Representative Print Name and Relationship to Patient    ..................................................               ................................................  Date                                            Time    ..........................................................................................................................................  Reviewed by Signature/Title    ...................................................              ..............................................  Date                                                            Time

## 2018-01-14 NOTE — ED PROVIDER NOTES
"  History     Chief Complaint   Patient presents with     Nausea & Vomiting     Pt had miscarriage end of october, had 1 menstrual cycle since (beg. )- took at home pregnancy test 2 weeks ago and was positive. Experiencing n/v & heartburn. Pt reports nightly \"fevers\"      HPI  Miguel Madera is a 33 year old  female who presents with nausea and vomiting. The patient was seen in the ED at the end of October for an active miscarriage. She states she has had one menstrual period since that miscarriage; however, she states she took an at home pregnancy test approximately two weeks ago that was positive.  Last menstrual period was at the beginning of December.  She presents today with one week of nausea and vomiting. She denies any abdominal pain, shortness of breath, or chest pain. She also notes \"gas\" and \"burning\" symptoms in the epigastrium when she lies down that is causing her discomfort, for which she states she has taken antacids in the past.  She denies dysuria, hematuria, vaginal discharge or bleeding.    Past Medical History:   Diagnosis Date     NO ACTIVE PROBLEMS      Pneumonia        Past Surgical History:   Procedure Laterality Date     No Surgical History         No family history on file.    Social History   Substance Use Topics     Smoking status: Never Smoker     Smokeless tobacco: Never Used     Alcohol use No       No current facility-administered medications for this encounter.      Current Outpatient Prescriptions   Medication     ranitidine (ZANTAC) 150 MG tablet     ondansetron (ZOFRAN ODT) 8 MG ODT tab     acetaminophen (TYLENOL) 500 MG tablet     docusate sodium (COLACE) 100 MG tablet     methylcellulose (CITRUCEL) 500 MG TABS tablet     oxyCODONE-acetaminophen (PERCOCET) 5-325 MG per tablet     acetaminophen (TYLENOL) 500 MG tablet     polyvinyl alcohol (LIQUIFILM TEARS) 1.4 % ophthalmic solution     Prenatal Vit-Fe Fumarate-FA (PRENATAL MULTIVITAMIN  PLUS IRON) 27-0.8 MG " "TABS      No Known Allergies    I have reviewed the Medications, Allergies, Past Medical and Surgical History, and Social History in the Epic system.    Review of Systems   Constitutional: Negative for fever.   Respiratory: Negative for shortness of breath.    Cardiovascular: Positive for chest pain (\"burning when lying supine\").   Gastrointestinal: Positive for nausea and vomiting. Negative for abdominal pain.   Genitourinary: Negative for dysuria and vaginal bleeding.   All other systems reviewed and are negative.      Physical Exam   BP: 103/67  Heart Rate: 60  Temp: 98.7  F (37.1  C)  Resp: 16  Height: 170.2 cm (5' 7\")  Weight: 92.7 kg (204 lb 6.4 oz)  SpO2: 99 %      Physical Exam    GEN:  Alert, well developed, no acute distress  HEENT:  PERRL, EOMI, Mucous membranes are moist.   Cardio:  RRR, no murmur, radial pulses equal bilaterally  PULM:  Lungs clear, good air movement, no wheezes, rales   Abd:  Soft, normal bowel sounds, no focal tenderness  Back exam:  No CVA tenderness  Musculoskeletal:  normal range of motion, no lower extremity swelling or calf tenderness  Neuro:  Alert and oriented X3, Follows commands, moving all extremities spontaneously   Skin:  Warm, dry    ED Course     ED Course     Procedures             Critical Care time:  None  She was given IV fluids for dehydration and IV Zofran for nausea.  Patient was also given a GI cocktail and had improvement in her symptoms.  Labs are normal except as shown.  Urine is a contaminated sample.  UPT is positive.  She was given potassium replacement in the emergency department.  Patient states that her potassium levels are \"always low\"    Labs Ordered and Resulted from Time of ED Arrival Up to the Time of Departure from the ED   BASIC METABOLIC PANEL - Abnormal; Notable for the following:        Result Value    Potassium 3.3 (*)     Creatinine 0.44 (*)     All other components within normal limits   ROUTINE UA WITH MICROSCOPIC REFLEX TO CULTURE - " Abnormal; Notable for the following:     Protein Albumin Urine 10 (*)     Squamous Epithelial /HPF Urine 6 (*)     Mucous Urine Present (*)     Calcium Oxalate Few (*)     All other components within normal limits   HCG QUAL URINE POCT - Abnormal; Notable for the following:    CBC WITH PLATELETS DIFFERENTIAL            Assessments & Plan (with Medical Decision Making)   Patient presents with nausea and vomiting for the past week.  She has a positive pregnancy test, but no abdominal pain, vaginal bleeding, leakage of fluid.  She does report some epigastric burning sensation radiating into the chest, which is likely GERD.  Patient was given prescriptions for her symptoms as shown below.  Patient's nausea vomiting is likely due to her pregnancy.  There is no sign of UTI, miscarriage, ectopic pregnancy, bowel obstruction or surgical abdomen.  She was advised to follow-up with her primary care provider or OB provider in the next few days.  She should return the emergency department with worsening symptoms.    I have reviewed the nursing notes.    I have reviewed the findings, diagnosis, plan and need for follow up with the patient.    Discharge Medication List as of 1/14/2018  2:28 PM      START taking these medications    Details   ondansetron (ZOFRAN ODT) 8 MG ODT tab Take 1 tablet (8 mg) by mouth every 8 hours as needed for nausea, Disp-12 tablet, R-0, Local Print      !! acetaminophen (TYLENOL) 500 MG tablet Take 1-2 tablets (500-1,000 mg) by mouth every 6 hours as needed for mild pain, Disp-30 tablet, R-0, Local Print       !! - Potential duplicate medications found. Please discuss with provider.          Final diagnoses:   Non-intractable vomiting with nausea, unspecified vomiting type   Gastroesophageal reflux disease, esophagitis presence not specified   Pregnancy, incidental   I, Kalpesh Olivas, am serving as a trained medical scribe to document services personally performed by Naa Bartholomew MD, based on the  provider's statements to me.      I, Naa Bartholomew MD, was physically present and have reviewed and verified the accuracy of this note documented by Kalpesh Olivas.       1/14/2018   [facility], [place], EMERGENCY DEPARTMENT     Vane Bartholomew MD  01/14/18 [id number]

## 2018-01-14 NOTE — ED AVS SNAPSHOT
[facility], Emergency Department    [address]    [place] [address]    Phone:  [phone]    Fax:  [phone]                                       Miguel Madera   MRN: [id number]    Department:  [facility], Emergency Department   Date of Visit:  1/14/2018           Patient Information     Date Of Birth          1985        Your diagnoses for this visit were:     Non-intractable vomiting with nausea, unspecified vomiting type     Gastroesophageal reflux disease, esophagitis presence not specified     Pregnancy, incidental        You were seen by Vane Bartholomew MD.        Discharge Instructions         Tips to Control Acid Reflux    To control acid reflux, you ll need to make some basic diet and lifestyle changes. The simple steps outlined below may be all you ll need to ease discomfort.  Watch what you eat    Avoid fatty foods and spicy foods.    Eat fewer acidic foods, such as citrus and tomato-based foods. These can increase symptoms.    Limit drinking alcohol, caffeine, and fizzy beverages. All increase acid reflux.    Try limiting chocolate, peppermint, and spearmint. These can worsen acid reflux in some people.  Watch when you eat    Avoid lying down for 3 hours after eating.    Do not snack before going to bed.  Raise your head  Raising your head and upper body by 4 to 6 inches helps limit reflux when you re lying down. Put blocks under the head of your bed frame to raise it.  Other changes    Lose weight, if you need to    Don t exercise near bedtime    Avoid tight-fitting clothes    Limit aspirin and ibuprofen    Stop smoking   Date Last Reviewed: 7/1/2016 2000-2017 [employer]. [address] [place] [address]. All rights reserved. This information is not intended as a substitute for professional medical care. Always follow your healthcare professional's instructions.    Please make an appointment to follow up with Your Primary Care Provider or  OB provider in 3-4 days for continued care.        Discharge References/Attachments     VOMITING (6Y-ADULT) (ENGLISH)      24 Hour Appointment Hotline       To make an appointment at any [facility] clinic, call [phone] ([phone]). If you don't have a family doctor or clinic, we will help you find one. [facility] clinics are conveniently located to serve the needs of you and your family.             Review of your medicines      START taking        Dose / Directions Last dose taken    ondansetron 8 MG ODT tab   Commonly known as:  ZOFRAN ODT   Dose:  8 mg   Quantity:  12 tablet        Take 1 tablet (8 mg) by mouth every 8 hours as needed for nausea   Refills:  0          Our records show that you are taking the medicines listed below. If these are incorrect, please call your family doctor or clinic.        Dose / Directions Last dose taken    docusate sodium 100 MG tablet   Commonly known as:  COLACE   Dose:  100 mg   Quantity:  60 tablet        Take 100 mg by mouth 2 times daily as needed for constipation   Refills:  0        methylcellulose 500 MG Tabs tablet   Commonly known as:  CITRUCEL   Dose:  500 mg   Quantity:  14 each        Take 1 tablet (500 mg) by mouth daily   Refills:  0        oxyCODONE-acetaminophen 5-325 MG per tablet   Commonly known as:  PERCOCET   Dose:  1-2 tablet   Quantity:  10 tablet        Take 1-2 tablets by mouth every 4 hours as needed for pain   Refills:  0        polyvinyl alcohol 1.4 % ophthalmic solution   Commonly known as:  LIQUIFILM TEARS   Dose:  1 drop   Quantity:  15 mL        Apply 1 drop to eye as needed for dry eyes Every hour as needed for itching in the eyes   Refills:  0        prenatal multivitamin plus iron 27-0.8 MG Tabs per tablet   Dose:  1 tablet   Quantity:  60 tablet        Take 1 tablet by mouth daily   Refills:  0        ranitidine 150 MG tablet   Commonly known as:  ZANTAC   Dose:  150 mg   Quantity:  60 tablet        Take 1 tablet (150 mg) by mouth 2  times daily   Refills:  0          ASK your doctor about these medications        Dose / Directions Last dose taken    * acetaminophen 500 MG tablet   Commonly known as:  TYLENOL   Dose:  500-1000 mg   What changed:  Another medication with the same name was added. Make sure you understand how and when to take each.   Quantity:  30 tablet   Ask about: Which instructions should I use?        Take 1-2 tablets (500-1,000 mg) by mouth every 6 hours as needed for mild pain   Refills:  0        * acetaminophen 500 MG tablet   Commonly known as:  TYLENOL   Dose:  500-1000 mg   What changed:  You were already taking a medication with the same name, and this prescription was added. Make sure you understand how and when to take each.   Quantity:  30 tablet   Ask about: Which instructions should I use?        Take 1-2 tablets (500-1,000 mg) by mouth every 6 hours as needed for mild pain   Refills:  0        * Notice:  This list has 2 medication(s) that are the same as other medications prescribed for you. Read the directions carefully, and ask your doctor or other care provider to review them with you.            Prescriptions were sent or printed at these locations (3 Prescriptions)                   Other Prescriptions                Printed at Department/Unit printer (3 of 3)         ranitidine (ZANTAC) 150 MG tablet               ondansetron (ZOFRAN ODT) 8 MG ODT tab               acetaminophen (TYLENOL) 500 MG tablet                Procedures and tests performed during your visit     Basic metabolic panel    CBC with platelets differential    UA with Microscopic reflex to Culture    hCG qual urine POCT      Orders Needing Specimen Collection     None      Pending Results     No orders found from 1/12/2018 to 1/15/2018.            Pending Culture Results     No orders found from 1/12/2018 to 1/15/2018.            Pending Results Instructions     If you had any lab results that were not finalized at the time of your  "Discharge, you can call the ED Lab Result RN at [phone]. You will be contacted by this team for any positive Lab results or changes in treatment. The nurses are available 7 days a week from 10A to 6:30P.  You can leave a message 24 hours per day and they will return your call.        Thank you for choosing [facility]       Thank you for choosing [facility] for your care. Our goal is always to provide you with excellent care. Hearing back from our patients is one way we can continue to improve our services. Please take a few minutes to complete the written survey that you may receive in the mail after you visit with us. Thank you!        Float: [employer] on Screen Information     [employer] lets you send messages to your doctor, view your test results, renew your prescriptions, schedule appointments and more. To sign up, go to [email or web address] . Click on \"Log in\" on the left side of the screen, which will take you to the Welcome page. Then click on \"Sign up Now\" on the right side of the page.     You will be asked to enter the access code listed below, as well as some personal information. Please follow the directions to create your username and password.     Your access code is: [email or web address]  Expires: 2018  8:54 PM     Your access code will  in 90 days. If you need help or a new code, please call your [facility] clinic or [phone].        Care EveryWhere ID     This is your Care EveryWhere ID. This could be used by other organizations to access your [facility] medical records  [id number]        Equal Access to Services     [facility]: Hadii logan sanchez hadasho Soayaanali, waaxda luqadaha, qaybta kaalmada adeegyada, leah lacy . So [facility] [phone].    ATENCIÓN: Si habla español, tiene a quiroga disposición servicios gratuitos de asistencia lingüística. Llame al [phone].    We comply with applicable federal civil rights laws and [place] laws. We do not discriminate on the basis of race, " color, national origin, age, disability, sex, sexual orientation, or gender identity.            After Visit Summary       This is your record. Keep this with you and show to your community pharmacist(s) and doctor(s) at your next visit.

## 2018-01-14 NOTE — DISCHARGE INSTRUCTIONS
Tips to Control Acid Reflux    To control acid reflux, you ll need to make some basic diet and lifestyle changes. The simple steps outlined below may be all you ll need to ease discomfort.  Watch what you eat    Avoid fatty foods and spicy foods.    Eat fewer acidic foods, such as citrus and tomato-based foods. These can increase symptoms.    Limit drinking alcohol, caffeine, and fizzy beverages. All increase acid reflux.    Try limiting chocolate, peppermint, and spearmint. These can worsen acid reflux in some people.  Watch when you eat    Avoid lying down for 3 hours after eating.    Do not snack before going to bed.  Raise your head  Raising your head and upper body by 4 to 6 inches helps limit reflux when you re lying down. Put blocks under the head of your bed frame to raise it.  Other changes    Lose weight, if you need to    Don t exercise near bedtime    Avoid tight-fitting clothes    Limit aspirin and ibuprofen    Stop smoking   Date Last Reviewed: 7/1/2016 2000-2017 [employer]. [address] [place] [address], [place] [address]. All rights reserved. This information is not intended as a substitute for professional medical care. Always follow your healthcare professional's instructions.    Please make an appointment to follow up with Your Primary Care Provider or OB provider in 3-4 days for continued care.

## 2018-01-22 ENCOUNTER — HOSPITAL ENCOUNTER (EMERGENCY)
Facility: CLINIC | Age: 33
Discharge: HOME | End: 2018-01-22
Attending: EMERGENCY MEDICINE | Admitting: EMERGENCY MEDICINE
Payer: COMMERCIAL

## 2018-01-22 VITALS
DIASTOLIC BLOOD PRESSURE: 85 MMHG | SYSTOLIC BLOOD PRESSURE: 101 MMHG | OXYGEN SATURATION: 99 % | RESPIRATION RATE: 16 BRPM | TEMPERATURE: 98.5 F

## 2018-01-22 DIAGNOSIS — R11.2 NON-INTRACTABLE VOMITING WITH NAUSEA, UNSPECIFIED VOMITING TYPE: ICD-10-CM

## 2018-01-22 LAB
ALBUMIN SERPL-MCNC: 3.3 G/DL (ref 3.4–5)
ALBUMIN UR-MCNC: 30 MG/DL
ALP SERPL-CCNC: 60 U/L (ref 40–150)
ALT SERPL W P-5'-P-CCNC: 27 U/L (ref 0–50)
ANION GAP SERPL CALCULATED.3IONS-SCNC: 11 MMOL/L (ref 3–14)
APPEARANCE UR: CLEAR
AST SERPL W P-5'-P-CCNC: 20 U/L (ref 0–45)
BACTERIA #/AREA URNS HPF: ABNORMAL /HPF
BASOPHILS # BLD AUTO: 0 10E9/L (ref 0–0.2)
BASOPHILS NFR BLD AUTO: 0.3 %
BILIRUB SERPL-MCNC: 0.5 MG/DL (ref 0.2–1.3)
BILIRUB UR QL STRIP: NEGATIVE
BUN SERPL-MCNC: 8 MG/DL (ref 7–30)
CALCIUM SERPL-MCNC: 8.9 MG/DL (ref 8.5–10.1)
CHLORIDE SERPL-SCNC: 102 MMOL/L (ref 94–109)
CO2 SERPL-SCNC: 23 MMOL/L (ref 20–32)
COLOR UR AUTO: YELLOW
CREAT SERPL-MCNC: 0.46 MG/DL (ref 0.52–1.04)
DIFFERENTIAL METHOD BLD: NORMAL
EOSINOPHIL # BLD AUTO: 0 10E9/L (ref 0–0.7)
EOSINOPHIL NFR BLD AUTO: 0.2 %
ERYTHROCYTE [DISTWIDTH] IN BLOOD BY AUTOMATED COUNT: 13.4 % (ref 10–15)
GFR SERPL CREATININE-BSD FRML MDRD: >90 ML/MIN/1.7M2
GLUCOSE SERPL-MCNC: 81 MG/DL (ref 70–99)
GLUCOSE UR STRIP-MCNC: NEGATIVE MG/DL
HCT VFR BLD AUTO: 38.5 % (ref 35–47)
HGB BLD-MCNC: 13 G/DL (ref 11.7–15.7)
HGB UR QL STRIP: NEGATIVE
IMM GRANULOCYTES # BLD: 0 10E9/L (ref 0–0.4)
IMM GRANULOCYTES NFR BLD: 0.3 %
KETONES UR STRIP-MCNC: >150 MG/DL
LEUKOCYTE ESTERASE UR QL STRIP: NEGATIVE
LYMPHOCYTES # BLD AUTO: 1.8 10E9/L (ref 0.8–5.3)
LYMPHOCYTES NFR BLD AUTO: 27.1 %
MCH RBC QN AUTO: 28.7 PG (ref 26.5–33)
MCHC RBC AUTO-ENTMCNC: 33.8 G/DL (ref 31.5–36.5)
MCV RBC AUTO: 85 FL (ref 78–100)
MONOCYTES # BLD AUTO: 0.3 10E9/L (ref 0–1.3)
MONOCYTES NFR BLD AUTO: 4.2 %
MUCOUS THREADS #/AREA URNS LPF: PRESENT /LPF
NEUTROPHILS # BLD AUTO: 4.5 10E9/L (ref 1.6–8.3)
NEUTROPHILS NFR BLD AUTO: 67.9 %
NITRATE UR QL: NEGATIVE
NRBC # BLD AUTO: 0 10*3/UL
NRBC BLD AUTO-RTO: 0 /100
PH UR STRIP: 6 PH (ref 5–7)
PLATELET # BLD AUTO: 207 10E9/L (ref 150–450)
POTASSIUM SERPL-SCNC: 3.2 MMOL/L (ref 3.4–5.3)
PROT SERPL-MCNC: 7.3 G/DL (ref 6.8–8.8)
RBC # BLD AUTO: 4.53 10E12/L (ref 3.8–5.2)
RBC #/AREA URNS AUTO: 1 /HPF (ref 0–2)
SODIUM SERPL-SCNC: 136 MMOL/L (ref 133–144)
SOURCE: ABNORMAL
SP GR UR STRIP: 1.02 (ref 1–1.03)
SQUAMOUS #/AREA URNS AUTO: 3 /HPF (ref 0–1)
UROBILINOGEN UR STRIP-MCNC: NORMAL MG/DL (ref 0–2)
WBC # BLD AUTO: 6.6 10E9/L (ref 4–11)
WBC #/AREA URNS AUTO: 1 /HPF (ref 0–2)

## 2018-01-22 PROCEDURE — 99284 EMERGENCY DEPT VISIT MOD MDM: CPT | Mod: Z6 | Performed by: EMERGENCY MEDICINE

## 2018-01-22 PROCEDURE — 96361 HYDRATE IV INFUSION ADD-ON: CPT | Performed by: EMERGENCY MEDICINE

## 2018-01-22 PROCEDURE — 25000132 ZZH RX MED GY IP 250 OP 250 PS 637: Performed by: EMERGENCY MEDICINE

## 2018-01-22 PROCEDURE — 96360 HYDRATION IV INFUSION INIT: CPT | Performed by: EMERGENCY MEDICINE

## 2018-01-22 PROCEDURE — 25000128 H RX IP 250 OP 636: Performed by: EMERGENCY MEDICINE

## 2018-01-22 PROCEDURE — 99284 EMERGENCY DEPT VISIT MOD MDM: CPT | Mod: 25 | Performed by: EMERGENCY MEDICINE

## 2018-01-22 PROCEDURE — 80053 COMPREHEN METABOLIC PANEL: CPT | Performed by: EMERGENCY MEDICINE

## 2018-01-22 PROCEDURE — 85025 COMPLETE CBC W/AUTO DIFF WBC: CPT | Performed by: EMERGENCY MEDICINE

## 2018-01-22 PROCEDURE — 25000125 ZZHC RX 250: Performed by: EMERGENCY MEDICINE

## 2018-01-22 PROCEDURE — 96374 THER/PROPH/DIAG INJ IV PUSH: CPT | Performed by: EMERGENCY MEDICINE

## 2018-01-22 PROCEDURE — 81001 URINALYSIS AUTO W/SCOPE: CPT | Performed by: EMERGENCY MEDICINE

## 2018-01-22 RX ORDER — ONDANSETRON 4 MG/1
4 TABLET, ORALLY DISINTEGRATING ORAL ONCE
Status: COMPLETED | OUTPATIENT
Start: 2018-01-22 | End: 2018-01-22

## 2018-01-22 RX ORDER — POTASSIUM CHLORIDE 1.5 G/1.58G
20 POWDER, FOR SOLUTION ORAL ONCE
Status: COMPLETED | OUTPATIENT
Start: 2018-01-22 | End: 2018-01-22

## 2018-01-22 RX ORDER — SODIUM CHLORIDE 9 MG/ML
1000 INJECTION, SOLUTION INTRAVENOUS CONTINUOUS
Status: DISCONTINUED | OUTPATIENT
Start: 2018-01-22 | End: 2018-01-22 | Stop reason: HOSPADM

## 2018-01-22 RX ADMIN — PROCHLORPERAZINE EDISYLATE 10 MG: 5 INJECTION INTRAMUSCULAR; INTRAVENOUS at 10:38

## 2018-01-22 RX ADMIN — POTASSIUM CHLORIDE 20 MEQ: 1.5 POWDER, FOR SOLUTION ORAL at 12:11

## 2018-01-22 RX ADMIN — ONDANSETRON 4 MG: 4 TABLET, ORALLY DISINTEGRATING ORAL at 14:42

## 2018-01-22 RX ADMIN — SODIUM CHLORIDE 1000 ML: 9 INJECTION, SOLUTION INTRAVENOUS at 11:26

## 2018-01-22 RX ADMIN — SODIUM CHLORIDE 1000 ML: 9 INJECTION, SOLUTION INTRAVENOUS at 14:42

## 2018-01-22 RX ADMIN — SODIUM CHLORIDE 1000 ML: 9 INJECTION, SOLUTION INTRAVENOUS at 10:38

## 2018-01-22 ASSESSMENT — ENCOUNTER SYMPTOMS
WEAKNESS: 1
FATIGUE: 1
ABDOMINAL PAIN: 1
BLOOD IN STOOL: 0
NAUSEA: 1
DYSURIA: 0
APPETITE CHANGE: 1
ANAL BLEEDING: 0
HEMATURIA: 0
DIARRHEA: 0
VOMITING: 1
DIFFICULTY URINATING: 0

## 2018-01-22 NOTE — ED AVS SNAPSHOT
" [facility], Emergency Department    [address]    [place] [address]    Phone:  [phone]    Fax:  [phone]                                       Miguel Madera   MRN: [id number]    Department:  [facility], Emergency Department   Date of Visit:  1/22/2018           Patient Information     Date Of Birth          1985        Your diagnoses for this visit were:     Non-intractable vomiting with nausea, unspecified vomiting type        You were seen by Mirela Kidd MD.        Discharge Instructions       Please make an appointment to follow up with OB/Gyn--[facility] (phone: [phone]), OB/Gyn--[facility] (phone: [phone]) or OB/Gyn--[facility] (phone: [phone]) as soon as possible.      Use zofran as needed for nausea and vomiting.  Continue to take small amounts of liquid frequently throughout the day to stay hydrated.      If you have any worsening symptoms such as persistent vomiting, abdominal pain, fevers, vaginal bleeding or bloody vomit/stools, return to the emergency department for re-evaluation.        * VOMITING [6yr-Adult]  Vomiting is a common symptom that may be due to different causes. These include gastroenteritis (\"stomach-flu\"), food poisoning and gastritis. There are other more serious causes of vomiting which may be hard to diagnose early in the illness. Therefore, it is important to watch for the warning signs listed below.  The main danger from repeated vomiting is \"dehydration\". This is due to excess loss of water and minerals from the body. When this occurs, body fluids must be replaced.`  HOME CARE:    If symptoms are severe, rest at home for the next 24 hours.    You may use acetaminophen (Tylenol) 650-1000 mg every 6 hours to control fever, unless another medicine was prescribed. [ NOTE : If you have chronic liver disease, talk with your doctor before using acetaminophen.] (Aspirin should never be used in " anyone under 18 years of age who is ill with a fever. It may cause severe liver damage.)    Avoid tobacco and alcohol use, which may worsen your symptoms.    If medicines for vomiting were prescribed, take as directed.  DURING THE FIRST 12-24 HOURS follow the diet below. Try to take frequent small sips even if you vomit occasionally:    FRUIT JUICES: Apple, grape juice, clear fruit drinks, electrolyte replacement and sports drinks.    BEVERAGES: Sport drinks such as Gatorade, soft drinks without caffeine; mineral water (plain or flavored), decaffeinated tea and coffee.    SOUPS: Clear broth, consommé and bouillon    DESSERTS: Plain gelatin (Jell-O), popsicles and fruit juice bars.  DURING THE NEXT 24 HOURS you may add the following to the above:    Hot cereal, plain toast, bread, rolls, crackers    Plain noodles, rice, mashed potatoes, chicken noodle or rice soup    Unsweetened canned fruit (avoid pineapple), bananas    Avoid dairy products    Limit caffeine and chocolate. No spices or seasonings except salt.  DURING THE NEXT 24 HOURS  Slowly go back to a normal diet, as you feel better and your symptoms lessen.  FOLLOW UP with your doctor as advised if you are not improving over the next 2-3 days.  GET PROMPT MEDICAL ATTENTION if any of the following occur:    Constant abdominal pain that stays in the same spot or gets worse    Continued vomiting (unable to keep liquids down) for 24 hours    Frequent diarrhea (more than 5 times a day); blood (red or black color) in diarrhea    No urine output for 12 hours or extreme thirst    Weakness, dizziness or fainting    Unusually drowsy or confused    Fever over 101.0  F (38.3  C) for more than 3 days    Yellow color of the eyes or skin    2212-4155 [employer]. [address] [place] [address], [place] [address]. All rights reserved. This information is not intended as a substitute for professional medical care. Always follow your healthcare professional's  instructions.  This information has been modified by your health care provider with permission from the publisher.         24 Hour Appointment Hotline       To make an appointment at any [facility], call [phone] ([phone]). If you don't have a family doctor or clinic, we will help you find one. [facility] clinics are conveniently located to serve the needs of you and your family.             Review of your medicines      Our records show that you are taking the medicines listed below. If these are incorrect, please call your family doctor or clinic.        Dose / Directions Last dose taken    acetaminophen 500 MG tablet   Commonly known as:  TYLENOL   Dose:  500-1000 mg   Quantity:  30 tablet        Take 1-2 tablets (500-1,000 mg) by mouth every 6 hours as needed for mild pain   Refills:  0        docusate sodium 100 MG tablet   Commonly known as:  COLACE   Dose:  100 mg   Quantity:  60 tablet        Take 100 mg by mouth 2 times daily as needed for constipation   Refills:  0        methylcellulose 500 MG Tabs tablet   Commonly known as:  CITRUCEL   Dose:  500 mg   Quantity:  14 each        Take 1 tablet (500 mg) by mouth daily   Refills:  0        oxyCODONE-acetaminophen 5-325 MG per tablet   Commonly known as:  PERCOCET   Dose:  1-2 tablet   Quantity:  10 tablet        Take 1-2 tablets by mouth every 4 hours as needed for pain   Refills:  0        polyvinyl alcohol 1.4 % ophthalmic solution   Commonly known as:  LIQUIFILM TEARS   Dose:  1 drop   Quantity:  15 mL        Apply 1 drop to eye as needed for dry eyes Every hour as needed for itching in the eyes   Refills:  0        prenatal multivitamin plus iron 27-0.8 MG Tabs per tablet   Dose:  1 tablet   Quantity:  60 tablet        Take 1 tablet by mouth daily   Refills:  0        ranitidine 150 MG tablet   Commonly known as:  ZANTAC   Dose:  150 mg   Quantity:  60 tablet        Take 1 tablet (150 mg) by mouth 2 times daily   Refills:  0        ZOFRAN  "PO   Dose:  4 mg        Take 4 mg by mouth every 8 hours as needed for nausea or vomiting   Refills:  0                Procedures and tests performed during your visit     CBC with platelets differential    Comprehensive metabolic panel    POC US OB TRANSABDOMINAL LIMITED    UA with Microscopic      Orders Needing Specimen Collection     None      Pending Results     No orders found from 2018 to 2018.            Pending Culture Results     No orders found from 2018 to 2018.            Pending Results Instructions     If you had any lab results that were not finalized at the time of your Discharge, you can call the ED Lab Result RN at [phone]. You will be contacted by this team for any positive Lab results or changes in treatment. The nurses are available 7 days a week from 10A to 6:30P.  You can leave a message 24 hours per day and they will return your call.        Thank you for choosing [facility]       Thank you for choosing [facility] for your care. Our goal is always to provide you with excellent care. Hearing back from our patients is one way we can continue to improve our services. Please take a few minutes to complete the written survey that you may receive in the mail after you visit with us. Thank you!        [employer] Information     [employer] lets you send messages to your doctor, view your test results, renew your prescriptions, schedule appointments and more. To sign up, go to [email or web address] . Click on \"Log in\" on the left side of the screen, which will take you to the Welcome page. Then click on \"Sign up Now\" on the right side of the page.     You will be asked to enter the access code listed below, as well as some personal information. Please follow the directions to create your username and password.     Your access code is: [email or web address]  Expires: 2018  8:54 PM     Your access code will  in 90 days. If you need help or a new code, please call your [facility] clinic " or [phone].        Care EveryWhere ID     This is your Care EveryWhere ID. This could be used by other organizations to access your [facility] medical records  [id number]        Equal Access to Services     SYDNEE TATE : Rubio Sood, aniceto garza, benjamindimitri kaelenitaantonieta purcell, leah sherman. So [facility] [phone].    ATENCIÓN: Si habla español, tiene a quiroga disposición servicios gratuitos de asistencia lingüística. Llame al [phone].    We comply with applicable federal civil rights laws and [place] laws. We do not discriminate on the basis of race, color, national origin, age, disability, sex, sexual orientation, or gender identity.            After Visit Summary       This is your record. Keep this with you and show to your community pharmacist(s) and doctor(s) at your next visit.

## 2018-01-22 NOTE — ED PROVIDER NOTES
"  History     Chief Complaint   Patient presents with     Nausea & Vomiting     vomiting x 4 days     HPI  Miguel Madera is a 33 year old otherwise healthy female who is  approximately 8 weeks pregnant who presents for evaluation of vomiting and generalized weakness. Patient complains of four days worth of vomiting increasing in frequency to the point where she has been unable to keep down any water or PO foods for the past 2-3 days. She presents tonight because she has had increased generalized weakness, fatigue, and last night noted dark red blood in her vomit. She notes she has two children at home who are also vomiting. She denies diarrhea, dysuria, hematuria, melena or tarry stools. She has had decreased urine output, and does complain of intermittent suprapubic abdominal pain with associated \"gas\" pains. No history of GI or biliary disease. No history of abdominal surgery.     Of note, patient was seen and evaluated here in the Emergency Department on 18, eight days ago, for similar symptoms at which time she was treated with IV fluids, IV Zofran for nausea, and a GI cocktail with improvement in her symptoms. She was prescribed 8 mg ODT tablets of Zofran for her nausea, but today notes that she hasn't been able to keep this medication down.     I have reviewed the Medications, Allergies, Past Medical and Surgical History, and Social History in the [facility] system.  Past Medical History:   Diagnosis Date     NO ACTIVE PROBLEMS      Pneumonia        Past Surgical History:   Procedure Laterality Date     No Surgical History         No family history on file.    Social History   Substance Use Topics     Smoking status: Never Smoker     Smokeless tobacco: Never Used     Alcohol use No       No current facility-administered medications for this encounter.      Current Outpatient Prescriptions   Medication     Ondansetron HCl (ZOFRAN PO)     ranitidine (ZANTAC) 150 MG tablet     acetaminophen (TYLENOL) 500 " MG tablet     docusate sodium (COLACE) 100 MG tablet     methylcellulose (CITRUCEL) 500 MG TABS tablet     oxyCODONE-acetaminophen (PERCOCET) 5-325 MG per tablet     polyvinyl alcohol (LIQUIFILM TEARS) 1.4 % ophthalmic solution     Prenatal Vit-Fe Fumarate-FA (PRENATAL MULTIVITAMIN  PLUS IRON) 27-0.8 MG TABS      No Known Allergies    Review of Systems   Constitutional: Positive for appetite change (decreased PO/fluid intake) and fatigue.   Gastrointestinal: Positive for abdominal pain (intermittent suprapubic), nausea and vomiting (w/dark red blood). Negative for anal bleeding, blood in stool and diarrhea.   Genitourinary: Positive for decreased urine volume. Negative for difficulty urinating, dysuria and hematuria.   Neurological: Positive for weakness (generalized).   All other systems reviewed and are negative.      Physical Exam   BP: 97/80  Heart Rate: 89  Temp: 98.5  F (36.9  C)  Resp: 18  SpO2: 100 %      Physical Exam  General: patient is alert and oriented and in no acute distress   Head: atraumatic and normocephalic   EENT: tacky mucus membranes without tonsillar erythema or exudates, pupils round and reactive, sclera anicteric  Neck: supple   Cardiovascular: regular rate and rhythm, extremities warm and well perfused, no lower extremity edema  Pulmonary: lungs clear to auscultation bilaterally   Abdomen: soft, very slight suprapubic tenderness to palpation without any guarding, no CVA tenderness   musculoskeletal: normal range of motion   Neurological: alert and oriented, moving all extremities symmetrically, gait normal   Skin: warm, dry     ED Course     ED Course     Procedures             Critical Care time:  none             Labs Ordered and Resulted from Time of ED Arrival Up to the Time of Departure from the ED - No data to display         Assessments & Plan (with Medical Decision Making)   Mrs. Madera is a 33 year old otherwise healthy female who is  approximately 8 weeks pregnant who  presents for evaluation of vomiting and generalized weakness.  Differential diagnosis includes but is not limited to viral gastritis, vomiting associated with pregnancy, UTI, Estefany-Vazquez tear, gastritis, peptic ulcer disease, less likely intra-abdominal infection, biliary disease.  She does appear mildly dehydrated on physical exam but is hemodynamically stable and not tachycardic.  She is afebrile.  Labs are obtained which did not show any evidence of anemia or leukocytosis.  Platelets are within normal limits.  I suspect that she may have had mild gastritis contributing to her episode of hematemesis.  She does not have evidence of significant GI bleed at this time.  She has not had any further episodes of hematemesis.  She does have mild hypokalemia with potassium of 3.2 which was supplemented.  Her LFTs are within normal limits.  Bedside ultrasound she does have a live IUP without evidence of ectopic pregnancy.  Bedside ultrasound of the gallbladder does not show any evidence of cholelithiasis or wall thickening.  UA negative.  She was given IV fluids, Compazine and zofran in the ED.  She continues to feel somewhat nauseated and have offered observation stay for IVF and antiemetics however patient has declined and requested to go home.  She does have zofran at home to take as needed.  Will discharge with close return instructions and follow-up with OBGYN to establish care.      I have reviewed the nursing notes.      I have reviewed the findings, diagnosis, plan and need for follow up with the patient.    New Prescriptions    No medications on file       Final diagnoses:   Non-intractable vomiting with nausea, unspecified vomiting type   I, Lisa Rios, am serving as a trained medical scribe to document services personally performed by Mirela Kidd MD, based on the provider's statements to me.   I, Mirela Kidd MD, was physically present and have reviewed and verified the accuracy of this note documented by  Lisa Rios.      1/22/2018   [facility], [place], EMERGENCY DEPARTMENT     Mirela Kidd MD  01/22/18 2223

## 2018-01-22 NOTE — DISCHARGE INSTRUCTIONS
"Please make an appointment to follow up with OB/Gyn--[facility] (phone: [phone]), OB/Gyn--[facility] (phone: [phone]) or [facility]--[facility] (phone: [phone]) as soon as possible.      Use zofran as needed for nausea and vomiting.  Continue to take small amounts of liquid frequently throughout the day to stay hydrated.      If you have any worsening symptoms such as persistent vomiting, abdominal pain, fevers, vaginal bleeding or bloody vomit/stools, return to the emergency department for re-evaluation.        * VOMITING [6yr-Adult]  Vomiting is a common symptom that may be due to different causes. These include gastroenteritis (\"stomach-flu\"), food poisoning and gastritis. There are other more serious causes of vomiting which may be hard to diagnose early in the illness. Therefore, it is important to watch for the warning signs listed below.  The main danger from repeated vomiting is \"dehydration\". This is due to excess loss of water and minerals from the body. When this occurs, body fluids must be replaced.`  HOME CARE:    If symptoms are severe, rest at home for the next 24 hours.    You may use acetaminophen (Tylenol) 650-1000 mg every 6 hours to control fever, unless another medicine was prescribed. [ NOTE : If you have chronic liver disease, talk with your doctor before using acetaminophen.] (Aspirin should never be used in anyone under 18 years of age who is ill with a fever. It may cause severe liver damage.)    Avoid tobacco and alcohol use, which may worsen your symptoms.    If medicines for vomiting were prescribed, take as directed.  DURING THE FIRST 12-24 HOURS follow the diet below. Try to take frequent small sips even if you vomit occasionally:    FRUIT JUICES: Apple, grape juice, clear fruit drinks, electrolyte replacement and sports drinks.    BEVERAGES: Sport drinks such as Gatorade, soft drinks without caffeine; mineral water (plain or " flavored), decaffeinated tea and coffee.    SOUPS: Clear broth, consommé and bouillon    DESSERTS: Plain gelatin (Jell-O), popsicles and fruit juice bars.  DURING THE NEXT 24 HOURS you may add the following to the above:    Hot cereal, plain toast, bread, rolls, crackers    Plain noodles, rice, mashed potatoes, chicken noodle or rice soup    Unsweetened canned fruit (avoid pineapple), bananas    Avoid dairy products    Limit caffeine and chocolate. No spices or seasonings except salt.  DURING THE NEXT 24 HOURS  Slowly go back to a normal diet, as you feel better and your symptoms lessen.  FOLLOW UP with your doctor as advised if you are not improving over the next 2-3 days.  GET PROMPT MEDICAL ATTENTION if any of the following occur:    Constant abdominal pain that stays in the same spot or gets worse    Continued vomiting (unable to keep liquids down) for 24 hours    Frequent diarrhea (more than 5 times a day); blood (red or black color) in diarrhea    No urine output for 12 hours or extreme thirst    Weakness, dizziness or fainting    Unusually drowsy or confused    Fever over 101.0  F (38.3  C) for more than 3 days    Yellow color of the eyes or skin    8215-4657 [employer]. [address] [place] [address], [place] [address]. All rights reserved. This information is not intended as a substitute for professional medical care. Always follow your healthcare professional's instructions.  This information has been modified by your health care provider with permission from the publisher.

## 2018-01-22 NOTE — ED NOTES
Approximately 2 months pregnant. Vomiting x 4 days, blood in vomit last night per report. Taking Zofran with no relief.

## 2018-01-22 NOTE — ED AVS SNAPSHOT
[facility], [place], Emergency Department    [address]    [place] [address]    Phone:  [phone]    Fax:  [phone]                                       Miguel Madera   MRN: [id number]    Department:  [facility], Emergency Department   Date of Visit:  1/22/2018           After Visit Summary Signature Page     I have received my discharge instructions, and my questions have been answered. I have discussed any challenges I see with this plan with the nurse or doctor.    ..........................................................................................................................................  Patient/Patient Representative Signature      ..........................................................................................................................................  Patient Representative Print Name and Relationship to Patient    ..................................................               ................................................  Date                                            Time    ..........................................................................................................................................  Reviewed by Signature/Title    ...................................................              ..............................................  Date                                                            Time

## 2018-02-09 ENCOUNTER — HOSPITAL ENCOUNTER (EMERGENCY)
Facility: CLINIC | Age: 33
Discharge: HOME | End: 2018-02-10
Attending: EMERGENCY MEDICINE | Admitting: EMERGENCY MEDICINE
Payer: COMMERCIAL

## 2018-02-09 DIAGNOSIS — R00.2 PALPITATIONS: ICD-10-CM

## 2018-02-09 DIAGNOSIS — O21.9 NAUSEA/VOMITING IN PREGNANCY: ICD-10-CM

## 2018-02-09 PROCEDURE — 99284 EMERGENCY DEPT VISIT MOD MDM: CPT | Mod: Z6 | Performed by: EMERGENCY MEDICINE

## 2018-02-09 PROCEDURE — 99283 EMERGENCY DEPT VISIT LOW MDM: CPT | Mod: 25 | Performed by: EMERGENCY MEDICINE

## 2018-02-09 NOTE — ED AVS SNAPSHOT
[facility], Emergency Department    [address]    [place] [address]    Phone:  [phone]    Fax:  [phone]                                       Miguel Madera   MRN: [id number]    Department:  [facility], Emergency Department   Date of Visit:  2/9/2018           Patient Information     Date Of Birth          1985        Your diagnoses for this visit were:     Palpitations     Nausea/vomiting in pregnancy        You were seen by Ernesto Mendez MD.        Discharge Instructions         Understanding Heart Palpitations    Heart palpitations are a symptom. It s the feeling you have when your heartbeat seems to be racing, pounding, skipping, or fluttering. Heart palpitations are most often felt in the chest. Sometimes, they may also be felt in the neck.  What causes heart palpitations?  In most cases, heart palpitations are caused by:    Stress or anxiety    Exercise    Pregnancy    Some medicines    Caffeine    Nicotine    Alcohol    Illegal drugs, such as cocaine    Health problems, such as anemia or overactive thyroid  In some cases, heart palpitations may be caused by a problem with the heart. Abnormal heart rhythms (arrhythmias) are the main concern. They may need to be managed by you and your healthcare provider or treated right away.  How are heart palpitations treated?  Treatments for heart palpitations depend on the cause. Options may include:    Managing the things that trigger your heart palpitations. This could mean:    Learning ways to reduce stress and anxiety    Avoiding caffeine, nicotine, alcohol, or illegal drugs    Stopping the use of certain medicines, under your doctor s guidance    Medicines, procedures, or surgery to treat an arrhythmia or other health problem that is causing your symptoms  What are the complications of heart palpitations?  Complications of heart palpitations are rare unless they are caused by a problem such as an arrhythmia. In such cases,  complications can include:    Fainting    Heart failure. This problem occurs when the heart is so weak it no longer pumps blood well.    Blood clots and stroke    Sudden cardiac arrest. This problem occurs when the heart suddenly stops beating.  When should I call my healthcare provider?  Call your healthcare provider right away if you have any of these:    Fever of 100.4 F (38 C) or higher, or as directed    Symptoms that don t get better with treatment, or symptoms that get worse    New symptoms, such as chest pain, shortness of breath, dizziness, or fainting   Date Last Reviewed: 5/1/2016 2000-2017 [employer]. [address] [place] [address]. All rights reserved. This information is not intended as a substitute for professional medical care. Always follow your healthcare professional's instructions.          24 Hour Appointment Hotline       To make an appointment at any [facility], call [phone] ([phone]). If you don't have a family doctor or clinic, we will help you find one. [facility] clinics are conveniently located to serve the needs of you and your family.             Review of your medicines      START taking        Dose / Directions Last dose taken    ondansetron 4 MG ODT tab   Commonly known as:  ZOFRAN ODT   Dose:  4 mg   Quantity:  10 tablet        Take 1 tablet (4 mg) by mouth every 8 hours as needed for nausea or vomiting   Refills:  0          CONTINUE these medicines which may have CHANGED, or have new prescriptions. If we are uncertain of the size of tablets/capsules you have at home, strength may be listed as something that might have changed.        Dose / Directions Last dose taken    * ranitidine 150 MG tablet   Commonly known as:  ZANTAC   Dose:  150 mg   What changed:  Another medication with the same name was added. Make sure you understand how and when to take each.   Quantity:  60 tablet        Take 1 tablet (150 mg) by mouth 2 times daily    Refills:  0        * ranitidine 150 MG tablet   Commonly known as:  ZANTAC   Dose:  150 mg   What changed:  You were already taking a medication with the same name, and this prescription was added. Make sure you understand how and when to take each.   Quantity:  30 tablet        Take 1 tablet (150 mg) by mouth 2 times daily for 15 days   Refills:  0        * Notice:  This list has 2 medication(s) that are the same as other medications prescribed for you. Read the directions carefully, and ask your doctor or other care provider to review them with you.      Our records show that you are taking the medicines listed below. If these are incorrect, please call your family doctor or clinic.        Dose / Directions Last dose taken    acetaminophen 500 MG tablet   Commonly known as:  TYLENOL   Dose:  500-1000 mg   Quantity:  30 tablet        Take 1-2 tablets (500-1,000 mg) by mouth every 6 hours as needed for mild pain   Refills:  0        docusate sodium 100 MG tablet   Commonly known as:  COLACE   Dose:  100 mg   Quantity:  60 tablet        Take 100 mg by mouth 2 times daily as needed for constipation   Refills:  0        methylcellulose 500 MG Tabs tablet   Commonly known as:  CITRUCEL   Dose:  500 mg   Quantity:  14 each        Take 1 tablet (500 mg) by mouth daily   Refills:  0        oxyCODONE-acetaminophen 5-325 MG per tablet   Commonly known as:  PERCOCET   Dose:  1-2 tablet   Quantity:  10 tablet        Take 1-2 tablets by mouth every 4 hours as needed for pain   Refills:  0        polyvinyl alcohol 1.4 % ophthalmic solution   Commonly known as:  LIQUIFILM TEARS   Dose:  1 drop   Quantity:  15 mL        Apply 1 drop to eye as needed for dry eyes Every hour as needed for itching in the eyes   Refills:  0        prenatal multivitamin plus iron 27-0.8 MG Tabs per tablet   Dose:  1 tablet   Quantity:  60 tablet        Take 1 tablet by mouth daily   Refills:  0        ZOFRAN PO   Dose:  4 mg        Take 4 mg by mouth  "every 8 hours as needed for nausea or vomiting   Refills:  0                Prescriptions were sent or printed at these locations (2 Prescriptions)                   Other Prescriptions                Printed at Department/Unit printer (2 of 2)         ondansetron (ZOFRAN ODT) 4 MG ODT tab               ranitidine (ZANTAC) 150 MG tablet                Orders Needing Specimen Collection     None      Pending Results     No orders found for last 3 day(s).            Pending Culture Results     No orders found for last 3 day(s).            Pending Results Instructions     If you had any lab results that were not finalized at the time of your Discharge, you can call the ED Lab Result RN at [phone]. You will be contacted by this team for any positive Lab results or changes in treatment. The nurses are available 7 days a week from 10A to 6:30P.  You can leave a message 24 hours per day and they will return your call.        Thank you for choosing [facility]       Thank you for choosing [facility] for your care. Our goal is always to provide you with excellent care. Hearing back from our patients is one way we can continue to improve our services. Please take a few minutes to complete the written survey that you may receive in the mail after you visit with us. Thank you!        buildabrand Information     [employer] lets you send messages to your doctor, view your test results, renew your prescriptions, schedule appointments and more. To sign up, go to [email or web address] . Click on \"Log in\" on the left side of the screen, which will take you to the Welcome page. Then click on \"Sign up Now\" on the right side of the page.     You will be asked to enter the access code listed below, as well as some personal information. Please follow the directions to create your username and password.     Your access code is: [email or web address]  Expires: 2018  3:14 AM     Your access code will  in 90 days. If you need help or a new code, " please call your [facility] clinic or [phone].        Care EveryWhere ID     This is your Care EveryWhere ID. This could be used by other organizations to access your [facility] medical records  [id number]        Equal Access to Services     SYDNEE SHERMAN: Rubio Sood, wajamesda luborisadaha, qaybta kaalmada binh, leah sherman. So [facility] [phone].    ATENCIÓN: Si habla español, tiene a quiroga disposición servicios gratuitos de asistencia lingüística. Llame al [phone].    We comply with applicable federal civil rights laws and [place] laws. We do not discriminate on the basis of race, color, national origin, age, disability, sex, sexual orientation, or gender identity.            After Visit Summary       This is your record. Keep this with you and show to your community pharmacist(s) and doctor(s) at your next visit.

## 2018-02-09 NOTE — ED AVS SNAPSHOT
[facility], [place], Emergency Department    [address]    [place] [address]    Phone:  [phone]    Fax:  [phone]                                       Miguel Madera   MRN: [id number]    Department:  [facility], Emergency Department   Date of Visit:  2/9/2018           After Visit Summary Signature Page     I have received my discharge instructions, and my questions have been answered. I have discussed any challenges I see with this plan with the nurse or doctor.    ..........................................................................................................................................  Patient/Patient Representative Signature      ..........................................................................................................................................  Patient Representative Print Name and Relationship to Patient    ..................................................               ................................................  Date                                            Time    ..........................................................................................................................................  Reviewed by Signature/Title    ...................................................              ..............................................  Date                                                            Time

## 2018-02-10 VITALS
HEIGHT: 69 IN | OXYGEN SATURATION: 97 % | SYSTOLIC BLOOD PRESSURE: 99 MMHG | TEMPERATURE: 98 F | DIASTOLIC BLOOD PRESSURE: 58 MMHG | RESPIRATION RATE: 14 BRPM | WEIGHT: 193 LBS | BODY MASS INDEX: 28.58 KG/M2 | HEART RATE: 68 BPM

## 2018-02-10 PROCEDURE — 96360 HYDRATION IV INFUSION INIT: CPT | Performed by: EMERGENCY MEDICINE

## 2018-02-10 PROCEDURE — 25000125 ZZHC RX 250: Performed by: EMERGENCY MEDICINE

## 2018-02-10 PROCEDURE — 25000128 H RX IP 250 OP 636: Performed by: EMERGENCY MEDICINE

## 2018-02-10 PROCEDURE — 96361 HYDRATE IV INFUSION ADD-ON: CPT | Performed by: EMERGENCY MEDICINE

## 2018-02-10 RX ORDER — SODIUM CHLORIDE 9 MG/ML
INJECTION, SOLUTION INTRAVENOUS CONTINUOUS
Status: DISCONTINUED | OUTPATIENT
Start: 2018-02-10 | End: 2018-02-10 | Stop reason: HOSPADM

## 2018-02-10 RX ORDER — ONDANSETRON 4 MG/1
4 TABLET, ORALLY DISINTEGRATING ORAL ONCE
Status: COMPLETED | OUTPATIENT
Start: 2018-02-10 | End: 2018-02-10

## 2018-02-10 RX ORDER — ONDANSETRON 2 MG/ML
4 INJECTION INTRAMUSCULAR; INTRAVENOUS EVERY 30 MIN PRN
Status: DISCONTINUED | OUTPATIENT
Start: 2018-02-10 | End: 2018-02-10

## 2018-02-10 RX ORDER — ONDANSETRON 4 MG/1
4 TABLET, ORALLY DISINTEGRATING ORAL EVERY 8 HOURS PRN
Qty: 10 TABLET | Refills: 0 | Status: SHIPPED | OUTPATIENT
Start: 2018-02-10 | End: 2018-02-13

## 2018-02-10 RX ADMIN — SODIUM CHLORIDE 1000 ML: 9 INJECTION, SOLUTION INTRAVENOUS at 00:30

## 2018-02-10 RX ADMIN — SODIUM CHLORIDE 1000 ML: 9 INJECTION, SOLUTION INTRAVENOUS at 02:31

## 2018-02-10 RX ADMIN — ONDANSETRON 4 MG: 4 TABLET, ORALLY DISINTEGRATING ORAL at 01:49

## 2018-02-10 ASSESSMENT — ENCOUNTER SYMPTOMS
ABDOMINAL PAIN: 0
APPETITE CHANGE: 1
FATIGUE: 1
WEAKNESS: 1
FEVER: 0
NAUSEA: 1
VOMITING: 1
PALPITATIONS: 1

## 2018-02-10 NOTE — DISCHARGE INSTRUCTIONS
Understanding Heart Palpitations    Heart palpitations are a symptom. It s the feeling you have when your heartbeat seems to be racing, pounding, skipping, or fluttering. Heart palpitations are most often felt in the chest. Sometimes, they may also be felt in the neck.  What causes heart palpitations?  In most cases, heart palpitations are caused by:    Stress or anxiety    Exercise    Pregnancy    Some medicines    Caffeine    Nicotine    Alcohol    Illegal drugs, such as cocaine    Health problems, such as anemia or overactive thyroid  In some cases, heart palpitations may be caused by a problem with the heart. Abnormal heart rhythms (arrhythmias) are the main concern. They may need to be managed by you and your healthcare provider or treated right away.  How are heart palpitations treated?  Treatments for heart palpitations depend on the cause. Options may include:    Managing the things that trigger your heart palpitations. This could mean:    Learning ways to reduce stress and anxiety    Avoiding caffeine, nicotine, alcohol, or illegal drugs    Stopping the use of certain medicines, under your doctor s guidance    Medicines, procedures, or surgery to treat an arrhythmia or other health problem that is causing your symptoms  What are the complications of heart palpitations?  Complications of heart palpitations are rare unless they are caused by a problem such as an arrhythmia. In such cases, complications can include:    Fainting    Heart failure. This problem occurs when the heart is so weak it no longer pumps blood well.    Blood clots and stroke    Sudden cardiac arrest. This problem occurs when the heart suddenly stops beating.  When should I call my healthcare provider?  Call your healthcare provider right away if you have any of these:    Fever of 100.4 F (38 C) or higher, or as directed    Symptoms that don t get better with treatment, or symptoms that get worse    New symptoms, such as chest pain,  shortness of breath, dizziness, or fainting   Date Last Reviewed: 5/1/2016 2000-2017 [employer]. [address] [place] [address], [place] [address]. All rights reserved. This information is not intended as a substitute for professional medical care. Always follow your healthcare professional's instructions.

## 2018-02-10 NOTE — ED PROVIDER NOTES
"  History     Chief Complaint   Patient presents with     Nausea & Vomiting     She is dry heaving in rooms. She is 2 mnths pregnant.     Palpitations     Pt reports heart beating fast or palpitations     HPI  Miguel Madera is a 33 year old 2 month pregnant female who presents to the ED for the evaluation of palpitations. She states that she was awoken from sleep tonight with a feeling that her \"heart was shaking,\" and she recorded her HR to be at 100 BPM. She otherwise is feeling nauseated, but she denies any problems with the pregnancy besides a lack of appetite. She is not currently experiencing any palpitations.     PAST MEDICAL HISTORY:   Past Medical History:   Diagnosis Date     NO ACTIVE PROBLEMS      Pneumonia        PAST SURGICAL HISTORY:   Past Surgical History:   Procedure Laterality Date     No Surgical History         FAMILY HISTORY: No family history on file.    SOCIAL HISTORY:   Social History   Substance Use Topics     Smoking status: Never Smoker     Smokeless tobacco: Never Used     Alcohol use No       Patient's Medications   New Prescriptions    ONDANSETRON (ZOFRAN ODT) 4 MG ODT TAB    Take 1 tablet (4 mg) by mouth every 8 hours as needed for nausea or vomiting    RANITIDINE (ZANTAC) 150 MG TABLET    Take 1 tablet (150 mg) by mouth 2 times daily for 15 days   Previous Medications    ACETAMINOPHEN (TYLENOL) 500 MG TABLET    Take 1-2 tablets (500-1,000 mg) by mouth every 6 hours as needed for mild pain    DOCUSATE SODIUM (COLACE) 100 MG TABLET    Take 100 mg by mouth 2 times daily as needed for constipation    METHYLCELLULOSE (CITRUCEL) 500 MG TABS TABLET    Take 1 tablet (500 mg) by mouth daily    ONDANSETRON HCL (ZOFRAN PO)    Take 4 mg by mouth every 8 hours as needed for nausea or vomiting    OXYCODONE-ACETAMINOPHEN (PERCOCET) 5-325 MG PER TABLET    Take 1-2 tablets by mouth every 4 hours as needed for pain    POLYVINYL ALCOHOL (LIQUIFILM TEARS) 1.4 % OPHTHALMIC SOLUTION    Apply 1 drop to " "eye as needed for dry eyes Every hour as needed for itching in the eyes    PRENATAL VIT-FE FUMARATE-FA (PRENATAL MULTIVITAMIN  PLUS IRON) 27-0.8 MG TABS    Take 1 tablet by mouth daily    RANITIDINE (ZANTAC) 150 MG TABLET    Take 1 tablet (150 mg) by mouth 2 times daily   Modified Medications    No medications on file   Discontinued Medications    No medications on file        No Known Allergies      I have reviewed the Medications, Allergies, Past Medical and Surgical History, and Social History in the Epic system.    Review of Systems   Constitutional: Positive for appetite change and fatigue. Negative for fever.   Cardiovascular: Positive for palpitations. Negative for chest pain and leg swelling.   Gastrointestinal: Positive for nausea and vomiting. Negative for abdominal pain.   Neurological: Positive for weakness.   All other systems reviewed and are negative.      Physical Exam   BP: 107/77  Pulse: 89  Temp: 98.4  F (36.9  C)  Resp: 18  Height: 175.3 cm (5' 9\")  Weight: 87.5 kg (193 lb)  SpO2: 99 %      Physical Exam   Constitutional: She is oriented to person, place, and time. She appears well-developed and well-nourished. No distress.   Cardiovascular: Normal rate, regular rhythm and normal heart sounds.    No murmur heard.  Pulmonary/Chest: Effort normal and breath sounds normal.   Abdominal: Soft. She exhibits no distension. There is no tenderness.   Neurological: She is alert and oriented to person, place, and time.   Psychiatric: She has a normal mood and affect. Her behavior is normal.   Nursing note and vitals reviewed.      ED Course     ED Course     Procedures        Medications   0.9% sodium chloride BOLUS (1,000 mLs Intravenous New Bag 2/10/18 0030)     Followed by   0.9% sodium chloride BOLUS (1,000 mLs Intravenous New Bag 2/10/18 0231)     Followed by   0.9% sodium chloride infusion (not administered)   ondansetron (ZOFRAN-ODT) ODT tab 4 mg (4 mg Oral Given 2/10/18 0149)            Labs Ordered " and Resulted from Time of ED Arrival Up to the Time of Departure from the ED - No data to display         Assessments & Plan (with Medical Decision Making)   33-year-old female at about 8 weeks pregnancy here because of episode of palpitations that was self-limited. Also, she has had chronic nausea and vomiting associated with pregnancy.  Here, she requested 2 bags of saline, which were given. She was given oral Zofran. Her heart is regular without murmur. She is not tachycardic.  She was given reassurance, she has prenatal care through [facility]. Her vital signs are normal. She has no abdominal pain or signs or symptoms of miscarriage. She was discharged home after the IV fluids.      This part of the document was transcribed by Flaco Barreto, Medical Scribe.       I have reviewed the nursing notes.    I have reviewed the findings, diagnosis, plan and need for follow up with the patient.    New Prescriptions    ONDANSETRON (ZOFRAN ODT) 4 MG ODT TAB    Take 1 tablet (4 mg) by mouth every 8 hours as needed for nausea or vomiting    RANITIDINE (ZANTAC) 150 MG TABLET    Take 1 tablet (150 mg) by mouth 2 times daily for 15 days       Final diagnoses:   Palpitations   Nausea/vomiting in pregnancy   I, Flaco Barreto, am serving as a trained medical scribe to document services personally performed by Ernesto Mendez MD, based on the provider's statements to me.      IErnesto MD, was physically present and have reviewed and verified the accuracy of this note documented by Flaco Barreto.       2/9/2018   [facility], [place], EMERGENCY DEPARTMENT     Ernesto Mendez MD  02/10/18 0251       Ernesto Mendez MD  02/10/18 0252       Ernesto Mendez MD  02/10/18 032

## 2018-04-20 ENCOUNTER — HOSPITAL ENCOUNTER (EMERGENCY)
Facility: CLINIC | Age: 33
Discharge: HOME | End: 2018-04-21
Attending: EMERGENCY MEDICINE | Admitting: EMERGENCY MEDICINE
Payer: COMMERCIAL

## 2018-04-20 DIAGNOSIS — Z3A.19 19 WEEKS GESTATION OF PREGNANCY: ICD-10-CM

## 2018-04-20 DIAGNOSIS — O26.892 HEADACHE IN PREGNANCY, ANTEPARTUM, SECOND TRIMESTER: ICD-10-CM

## 2018-04-20 DIAGNOSIS — R51.9 HEADACHE IN PREGNANCY, ANTEPARTUM, SECOND TRIMESTER: ICD-10-CM

## 2018-04-20 DIAGNOSIS — R10.9 ABDOMINAL PAIN AFFECTING PREGNANCY: ICD-10-CM

## 2018-04-20 DIAGNOSIS — O26.899 ABDOMINAL PAIN AFFECTING PREGNANCY: ICD-10-CM

## 2018-04-20 DIAGNOSIS — R10.2 ADNEXAL TENDERNESS, RIGHT: ICD-10-CM

## 2018-04-20 PROCEDURE — 87210 SMEAR WET MOUNT SALINE/INK: CPT | Performed by: FAMILY MEDICINE

## 2018-04-20 PROCEDURE — 87491 CHLMYD TRACH DNA AMP PROBE: CPT | Performed by: FAMILY MEDICINE

## 2018-04-20 PROCEDURE — 87591 N.GONORRHOEAE DNA AMP PROB: CPT | Performed by: FAMILY MEDICINE

## 2018-04-20 PROCEDURE — 81001 URINALYSIS AUTO W/SCOPE: CPT | Performed by: FAMILY MEDICINE

## 2018-04-20 PROCEDURE — 99284 EMERGENCY DEPT VISIT MOD MDM: CPT | Mod: Z6 | Performed by: FAMILY MEDICINE

## 2018-04-20 PROCEDURE — 99284 EMERGENCY DEPT VISIT MOD MDM: CPT | Mod: 25

## 2018-04-20 ASSESSMENT — ENCOUNTER SYMPTOMS: ABDOMINAL PAIN: 1

## 2018-04-20 NOTE — ED AVS SNAPSHOT
[facility], Emergency Department    [address]    [place] [address]    Phone:  [phone]    Fax:  [phone]                                       Miguel Madera   MRN: [id number]    Department:  [facility], Emergency Department   Date of Visit:  4/20/2018           Patient Information     Date Of Birth          1985        Your diagnoses for this visit were:     Abdominal pain affecting pregnancy        You were seen by Angeles Eller MD, Korey Crandall MD, and Ayse Barclay MD.        Discharge Instructions       Thank you for your patience today.  Please follow-up with your regular doctor or OB/GYN in the next 2-3 days for further evaluation and follow-up care.  Please call to schedule an appointment.  Please continue your own medications.  Please return to the ER if you develop a fever, persistent vomiting, severe pain, vaginal bleeding, or any worsening of your current symptoms.  It was a pleasure taking care of you today.  We hope you feel better soon.      24 Hour Appointment Hotline       To make an appointment at any [facility] clinic, call [phone] ([phone]). If you don't have a family doctor or clinic, we will help you find one. [facility] clinics are conveniently located to serve the needs of you and your family.             Review of your medicines      Our records show that you are taking the medicines listed below. If these are incorrect, please call your family doctor or clinic.        Dose / Directions Last dose taken    acetaminophen 500 MG tablet   Commonly known as:  TYLENOL   Dose:  500-1000 mg   Quantity:  30 tablet        Take 1-2 tablets (500-1,000 mg) by mouth every 6 hours as needed for mild pain   Refills:  0        docusate sodium 100 MG tablet   Commonly known as:  COLACE   Dose:  100 mg   Quantity:  60 tablet        Take 100 mg by mouth 2 times daily as needed for constipation   Refills:  0        methylcellulose 500 MG Tabs tablet    Commonly known as:  CITRUCEL   Dose:  500 mg   Quantity:  14 each        Take 1 tablet (500 mg) by mouth daily   Refills:  0        oxyCODONE-acetaminophen 5-325 MG per tablet   Commonly known as:  PERCOCET   Dose:  1-2 tablet   Quantity:  10 tablet        Take 1-2 tablets by mouth every 4 hours as needed for pain   Refills:  0        polyvinyl alcohol 1.4 % ophthalmic solution   Commonly known as:  LIQUIFILM TEARS   Dose:  1 drop   Quantity:  15 mL        Apply 1 drop to eye as needed for dry eyes Every hour as needed for itching in the eyes   Refills:  0        prenatal multivitamin plus iron 27-0.8 MG Tabs per tablet   Dose:  1 tablet   Quantity:  60 tablet        Take 1 tablet by mouth daily   Refills:  0        ZOFRAN PO   Dose:  4 mg        Take 4 mg by mouth every 8 hours as needed for nausea or vomiting   Refills:  0                Procedures and tests performed during your visit     Chlamydia trachomatis PCR    Neisseria gonorrhoeae PCR    UA with Microscopic reflex to Culture    US OB Limited One Or More Fetuses    Wet prep      Orders Needing Specimen Collection     None      Pending Results     Date and Time Order Name Status Description    4/20/2018 2336 Chlamydia trachomatis PCR In process     4/20/2018 2336 Neisseria gonorrhoeae PCR In process             Pending Culture Results     Date and Time Order Name Status Description    4/20/2018 2336 Chlamydia trachomatis PCR In process     4/20/2018 2336 Neisseria gonorrhoeae PCR In process             Pending Results Instructions     If you had any lab results that were not finalized at the time of your Discharge, you can call the ED Lab Result RN at [phone]. You will be contacted by this team for any positive Lab results or changes in treatment. The nurses are available 7 days a week from 10A to 6:30P.  You can leave a message 24 hours per day and they will return your call.        Thank you for choosing [facility]       Thank you for choosing  "[facility] for your care. Our goal is always to provide you with excellent care. Hearing back from our patients is one way we can continue to improve our services. Please take a few minutes to complete the written survey that you may receive in the mail after you visit with us. Thank you!        myThingsharWaveborn Information     CSR[employer] lets you send messages to your doctor, view your test results, renew your prescriptions, schedule appointments and more. To sign up, go to [email or web address] . Click on \"Log in\" on the left side of the screen, which will take you to the Welcome page. Then click on \"Sign up Now\" on the right side of the page.     You will be asked to enter the access code listed below, as well as some personal information. Please follow the directions to create your username and password.     Your access code is: [email or web address]  Expires: 2018  4:14 AM     Your access code will  in 90 days. If you need help or a new code, please call your [facility] clinic or [phone].        Care EveryWhere ID     This is your Care EveryWhere ID. This could be used by other organizations to access your [facility] medical records  [id number]        Equal Access to Services     SYDNEE TATE AH: Rubio Sood, aniceto garza, franchesca purcell, leah sherman. So [facility] [phone].    ATENCIÓN: Si habla español, tiene a quiroga disposición servicios gratuitos de asistencia lingüística. Llame al [phone].    We comply with applicable federal civil rights laws and [place] laws. We do not discriminate on the basis of race, color, national origin, age, disability, sex, sexual orientation, or gender identity.            After Visit Summary       This is your record. Keep this with you and show to your community pharmacist(s) and doctor(s) at your next visit.                  "

## 2018-04-20 NOTE — ED AVS SNAPSHOT
[facility], [place], Emergency Department    [address]    [place] [address]    Phone:  [phone]    Fax:  [phone]                                       Miguel Madera   MRN: [id number]    Department:  [facility], Emergency Department   Date of Visit:  4/20/2018           After Visit Summary Signature Page     I have received my discharge instructions, and my questions have been answered. I have discussed any challenges I see with this plan with the nurse or doctor.    ..........................................................................................................................................  Patient/Patient Representative Signature      ..........................................................................................................................................  Patient Representative Print Name and Relationship to Patient    ..................................................               ................................................  Date                                            Time    ..........................................................................................................................................  Reviewed by Signature/Title    ...................................................              ..............................................  Date                                                            Time

## 2018-04-21 ENCOUNTER — APPOINTMENT (OUTPATIENT)
Dept: ULTRASOUND IMAGING | Facility: CLINIC | Age: 33
End: 2018-04-21
Attending: FAMILY MEDICINE
Payer: COMMERCIAL

## 2018-04-21 VITALS
OXYGEN SATURATION: 100 % | RESPIRATION RATE: 16 BRPM | TEMPERATURE: 98.2 F | BODY MASS INDEX: 29.05 KG/M2 | HEART RATE: 72 BPM | SYSTOLIC BLOOD PRESSURE: 86 MMHG | DIASTOLIC BLOOD PRESSURE: 56 MMHG | WEIGHT: 196.7 LBS

## 2018-04-21 LAB
ALBUMIN UR-MCNC: NEGATIVE MG/DL
APPEARANCE UR: CLEAR
BACTERIA #/AREA URNS HPF: ABNORMAL /HPF
BILIRUB UR QL STRIP: NEGATIVE
C TRACH DNA SPEC QL NAA+PROBE: NEGATIVE
COLOR UR AUTO: ABNORMAL
GLUCOSE UR STRIP-MCNC: NEGATIVE MG/DL
HGB UR QL STRIP: NEGATIVE
KETONES UR STRIP-MCNC: NEGATIVE MG/DL
LEUKOCYTE ESTERASE UR QL STRIP: NEGATIVE
MUCOUS THREADS #/AREA URNS LPF: PRESENT /LPF
N GONORRHOEA DNA SPEC QL NAA+PROBE: NEGATIVE
NITRATE UR QL: NEGATIVE
PH UR STRIP: 6 PH (ref 5–7)
RBC #/AREA URNS AUTO: <1 /HPF (ref 0–2)
SOURCE: ABNORMAL
SP GR UR STRIP: 1 (ref 1–1.03)
SPECIMEN SOURCE: NORMAL
UROBILINOGEN UR STRIP-MCNC: NORMAL MG/DL (ref 0–2)
WBC #/AREA URNS AUTO: 0 /HPF (ref 0–5)
WET PREP SPEC: NORMAL

## 2018-04-21 PROCEDURE — 76815 OB US LIMITED FETUS(S): CPT

## 2018-04-21 NOTE — ED PROVIDER NOTES
[facility] EMERGENCY DEPARTMENT ([facility])    4/20/18       History     Chief Complaint   Patient presents with     Abdominal Pain     Pt is approx 19wks pregnant and c/o abd pain.     HPI  Miguel Madera is a 33 year old female who is currently 19 weeks pregnant and presents to the Emergency Department for evaluation of abdominal pain.  The patient states that her pain started last night and has been constant since.  She localizes the pain to the suprapubic region.  She denies any vaginal discharge or vaginal bleeding.    I have reviewed the Medications, Allergies, Past Medical and Surgical History, and Social History in the [employer] system.    Past Medical History:   Diagnosis Date     NO ACTIVE PROBLEMS      Pneumonia        Past Surgical History:   Procedure Laterality Date     No Surgical History         No family history on file.    Social History   Substance Use Topics     Smoking status: Never Smoker     Smokeless tobacco: Never Used     Alcohol use No       No current facility-administered medications for this encounter.      Current Outpatient Prescriptions   Medication     acetaminophen (TYLENOL) 500 MG tablet     docusate sodium (COLACE) 100 MG tablet     methylcellulose (CITRUCEL) 500 MG TABS tablet     Ondansetron HCl (ZOFRAN PO)     oxyCODONE-acetaminophen (PERCOCET) 5-325 MG per tablet     polyvinyl alcohol (LIQUIFILM TEARS) 1.4 % ophthalmic solution     Prenatal Vit-Fe Fumarate-FA (PRENATAL MULTIVITAMIN  PLUS IRON) 27-0.8 MG TABS      No Known Allergies      Review of Systems   Gastrointestinal: Positive for abdominal pain (suprapubic).   Genitourinary: Negative for vaginal bleeding and vaginal discharge.   All other systems reviewed and are negative.      Physical Exam   BP: 91/44  Heart Rate: 75  Temp: 97.6  F (36.4  C)  Resp: 18  Weight: 89.2 kg (196 lb 11.2 oz)  SpO2: 99 %      Physical Exam   Constitutional: She is oriented to person, place, and time. No distress.   HENT:   Head:  Atraumatic.   Mouth/Throat: Oropharynx is clear and moist. No oropharyngeal exudate.   Eyes: Pupils are equal, round, and reactive to light. No scleral icterus.   Cardiovascular: Normal heart sounds and intact distal pulses.    Pulmonary/Chest: Breath sounds normal. No respiratory distress.   Abdominal: Soft. Bowel sounds are normal. There is tenderness.   Genitourinary: Uterus is enlarged. Uterus is not tender. Cervix exhibits no motion tenderness. Right adnexum displays no mass and no tenderness. Left adnexum displays no mass and no tenderness. No bleeding in the vagina.   Musculoskeletal: She exhibits no edema or tenderness.   Neurological: She is alert and oriented to person, place, and time. No cranial nerve deficit. She exhibits normal muscle tone. Coordination normal.   Skin: Skin is warm. No rash noted. She is not diaphoretic.       ED Course   11:21 PM  The patient was seen and examined by Korey Crandall MD in Room ED03.     ED Course     Procedures         Critical Care time:  none       Labs Ordered and Resulted from Time of ED Arrival Up to the Time of Departure from the ED   ROUTINE UA WITH MICROSCOPIC REFLEX TO CULTURE - Abnormal; Notable for the following:        Result Value    Bacteria Urine Few (*)     Mucous Urine Present (*)     All other components within normal limits   NEISSERIA GONORRHOEAE PCR   CHLAMYDIA TRACHOMATIS PCR   WET PREP       Ultrasound is pending at time of dictation.    Assessments & Plan (with Medical Decision Making)     I have reviewed the nursing notes.    I have reviewed the findings, diagnosis, plan and need for follow up with the patient.  Patient with suprapubic lower abdominal pain with 19 week pregnancy at this time initial pelvic examination did not reveal any abnormality cultures were obtained urinalysis was obtained which was negative and patient will be undergoing ultrasound to rule out any abnormality.  If ultrasound does not reveal any significant problem  patient will follow up with her primary OB/GYN for recheck and further evaluation next week.  She has been instructed to return to the emergency room if she has any marked increase in pain or develops any bleeding.    New Prescriptions    No medications on file       Final diagnoses:   Abdominal pain affecting pregnancy     IMehrdad, am serving as a trained medical scribe to document services personally performed by Korey Crandall MD, based on the provider's statements to me.   IKorey MD, was physically present and have reviewed and verified the accuracy of this note documented by Mehrdad Blanchard.    4/20/2018   [facility], EMERGENCY DEPARTMENT     Korey Crandall MD  04/21/18 0140

## 2018-04-21 NOTE — ED NOTES
.SIGN-OUT:  - Assumed care of this patient from Dr. Crandall  -Patient is a 33-year-old female who presents with abdominal pain, patient currently 19 weeks pregnant.  Denies any vaginal discharge or vaginal bleeding.  - Pending at shift change:  US pelvis pending  - Tentative plan: Likely discharge home if US negative and follow up with ob/gyn     REASSESSMENT:  - No acute issues or interventions necessary while still in the emergency department. I reviewed US which demonstrated single live IUP at 20 weeks 2 days, otherwise unremarkable.  On reevaluation abdomen is soft, nontender, nondistended, no peritoneal signs.  I discussed results with patient and plan for discharge home with close follow-up with OB/GYN.  Patient understands and agrees with the plan.     DISPOSITION:  - Patient carried on with their original disposition plan.       Ayse Barclay MD  04/21/18 0257

## 2018-04-21 NOTE — DISCHARGE INSTRUCTIONS
Thank you for your patience today.  Please follow-up with your regular doctor or OB/GYN in the next 2-3 days for further evaluation and follow-up care.  Please call to schedule an appointment.  Please continue your own medications.  Please return to the ER if you develop a fever, persistent vomiting, severe pain, vaginal bleeding, or any worsening of your current symptoms.  It was a pleasure taking care of you today.  We hope you feel better soon.

## 2018-11-02 ENCOUNTER — TRANSFERRED RECORDS (OUTPATIENT)
Dept: HEALTH INFORMATION MANAGEMENT | Facility: CLINIC | Age: 33
End: 2018-11-02

## 2019-02-21 ENCOUNTER — TRANSFERRED RECORDS (OUTPATIENT)
Dept: HEALTH INFORMATION MANAGEMENT | Facility: CLINIC | Age: 34
End: 2019-02-21

## 2019-03-11 ENCOUNTER — TRANSFERRED RECORDS (OUTPATIENT)
Dept: HEALTH INFORMATION MANAGEMENT | Facility: CLINIC | Age: 34
End: 2019-03-11

## 2019-03-11 ENCOUNTER — MEDICAL CORRESPONDENCE (OUTPATIENT)
Dept: HEALTH INFORMATION MANAGEMENT | Facility: CLINIC | Age: 34
End: 2019-03-11

## 2019-03-19 NOTE — PROGRESS NOTES
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  She had a normal spontaneous vaginal delivery 14 years ago.  There were no complications.  She had a miscarriage 12 years ago and again does not report any complications.  For the last 4 years her cycles have been progressively lighter.  She had a hysteroscopy on 2/.21 that showed significant Asherman syndrome.  She does not believe that any treatment was undertaken at that time.      No LMP recorded (lmp unknown)..   Patient is not sexually active, No obstetric history on file..  Using not sexually active for contraception.    reports that  has never smoked. she has never used smokeless tobacco.    STD testing offered?  Declined    Health maintenance updated:  yes    Today's PHQ-2 Score: No flowsheet data found.  Today's PHQ-9 Score: No flowsheet data found.  Today's ZACHARY-7 Score: No flowsheet data found.    Problem list and histories reviewed & adjusted, as indicated.  Additional history: as documented.    There is no problem list on file for this patient.    No past surgical history on file.   Social History     Tobacco Use     Smoking status: Never Smoker     Smokeless tobacco: Never Used   Substance Use Topics     Alcohol use: No     Frequency: Never           Current Outpatient Medications   Medication Sig     vitamin D3 (D3-50) 34343 units capsule Take 50,000 Units by mouth     No current facility-administered medications for this visit.      No Known Allergies    ROS:  12 point review of systems negative other than symptoms noted below.    OBJECTIVE:  "    /60   Pulse 64   Ht 1.753 m (5' 9\")   Wt 92.1 kg (203 lb)   LMP  (LMP Unknown)   Breastfeeding? No   BMI 29.98 kg/m    Body mass index is 29.98 kg/m .    Exam:  Constitutional:  Appearance: Well nourished, well developed alert, in no acute distress  Neck:  Lymph Nodes:  No lymphadenopathy present; Thyroid:  Gland size normal, nontender, no nodules or masses present on palpation  Chest:  Respiratory Effort:  Breathing unlabored  Cardiovascular: Heart: Auscultation:  Regular rate, normal rhythm, no murmurs present  Gastrointestinal:  Abdominal Examination:  Abdomen nontender to palpation, tone normal without rigidity or guarding, no masses present, umbilicus without lesions; Liver/Spleen:  No hepatomegaly present, liver nontender to palpation; Hernias:  No hernias present  Lymphatic: Lymph Nodes:  No other lymphadenopathy present  Skin:General Inspection:  No rashes present, no lesions present, no areas of discoloration; Genitalia and Groin:  No rashes present, no lesions present, no areas of discoloration, no masses present.  Neurologic/Psychiatric:  Mental Status:  Oriented X3   Pelvic Exam:  External Genitalia:     Normal appearance for age, no discharge present, no tenderness present, no inflammatory lesions present, color normal  Vagina:     Normal vaginal vault without central or paravaginal defects, no discharge present, no inflammatory lesions present, no masses present  Bladder:     Nontender to palpation  Urethra:   Urethral Body:  Urethra palpation normal, urethra structural support normal   Urethral Meatus:  No erythema or lesions present  Cervix:     Appearance healthy, no lesions present, nontender to palpation, no bleeding present  Uterus:     Uterus: firm, normal sized and nontender, midplane in position.   Adnexa:     No adnexal tenderness present, no adnexal masses present  Perineum:     Perineum within normal limits, no evidence of trauma, no rashes or skin lesions present  Anus:  "    Anus within normal limits, no hemorrhoids present  Inguinal Lymph Nodes:     No lymphadenopathy present  Pubic Hair:     Normal pubic hair distribution for age  Genitalia and Groin:     No rashes present, no lesions present, no areas of discoloration, no masses present       In-Clinic Test Results:      ASSESSMENT/PLAN:                                                      34-year-old  2 para 1011 with a recent diagnosis of Asherman syndrome.  This lines up with a progressive decrease in the amount of menses that she has been experiencing over the last 4 years.  She has no clear infectious etiology or complication from her miscarriage and to account for this scarring.          Paulo Woodruff MD  [facility] [place]

## 2019-03-20 ENCOUNTER — OFFICE VISIT (OUTPATIENT)
Dept: OBGYN | Facility: CLINIC | Age: 34
End: 2019-03-20
Payer: COMMERCIAL

## 2019-03-20 ENCOUNTER — TELEPHONE (OUTPATIENT)
Dept: OBGYN | Facility: CLINIC | Age: 34
End: 2019-03-20

## 2019-03-20 VITALS
SYSTOLIC BLOOD PRESSURE: 104 MMHG | BODY MASS INDEX: 30.07 KG/M2 | HEART RATE: 64 BPM | WEIGHT: 203 LBS | DIASTOLIC BLOOD PRESSURE: 60 MMHG | HEIGHT: 69 IN

## 2019-03-20 DIAGNOSIS — N85.6 ASHERMAN'S SYNDROME: Primary | ICD-10-CM

## 2019-03-20 PROCEDURE — 99203 OFFICE O/P NEW LOW 30 MIN: CPT | Performed by: OBSTETRICS & GYNECOLOGY

## 2019-03-20 SDOH — HEALTH STABILITY: MENTAL HEALTH: HOW OFTEN DO YOU HAVE A DRINK CONTAINING ALCOHOL?: NEVER

## 2019-03-20 ASSESSMENT — ANXIETY QUESTIONNAIRES
3. WORRYING TOO MUCH ABOUT DIFFERENT THINGS: NOT AT ALL
2. NOT BEING ABLE TO STOP OR CONTROL WORRYING: NOT AT ALL
7. FEELING AFRAID AS IF SOMETHING AWFUL MIGHT HAPPEN: NOT AT ALL
5. BEING SO RESTLESS THAT IT IS HARD TO SIT STILL: NOT AT ALL
1. FEELING NERVOUS, ANXIOUS, OR ON EDGE: NOT AT ALL
GAD7 TOTAL SCORE: 0
6. BECOMING EASILY ANNOYED OR IRRITABLE: NOT AT ALL
IF YOU CHECKED OFF ANY PROBLEMS ON THIS QUESTIONNAIRE, HOW DIFFICULT HAVE THESE PROBLEMS MADE IT FOR YOU TO DO YOUR WORK, TAKE CARE OF THINGS AT HOME, OR GET ALONG WITH OTHER PEOPLE: NOT DIFFICULT AT ALL

## 2019-03-20 ASSESSMENT — MIFFLIN-ST. JEOR: SCORE: 1675.18

## 2019-03-20 ASSESSMENT — PATIENT HEALTH QUESTIONNAIRE - PHQ9
5. POOR APPETITE OR OVEREATING: NOT AT ALL
SUM OF ALL RESPONSES TO PHQ QUESTIONS 1-9: 0

## 2019-03-20 NOTE — TELEPHONE ENCOUNTER
Type of surgery: HSC LYSIS OF ADHESIONS  Location of surgery: [facility] OR  Date and time of surgery: 4/4/2019 10:10a ARRIVAL 8:10a  Surgeon: Ranjan  Pre-Op Appt Date: 3/20/2019  Post-Op Appt Date: TBD   Packet sent out: HANDED 3/20/2019  Pre-cert/Authorization completed:  TBD  Date: 3/20/2019 Spoke w/Scarlett Kim #[id number] from [facility] requested with [facility] scheduling    Susie Guevara  Surgery Scheduler        Order Questions     Question Answer Comment   Procedure name(s) - multi select Hysteroscopy, lysis of intrauterine adhesions    Is this a multi surgeon case? No    Laterality N/A    Reason for procedure Asherman syndrome    Location of Case: [facility] OR    Surgeon Procedure Time (incision to closure) in minutes (per procedure as applicable) 45    Note:  Surgical Case Time Needed (in minutes)   Patient Class (for admit prior to surgery, specify number of days in comments): Same day (hospital outpatient)    Why can t this outpatient surgery be done at the [facility] or [facility]? -    Anesthesia General    Vendor Needed? No

## 2019-03-21 ASSESSMENT — ANXIETY QUESTIONNAIRES: GAD7 TOTAL SCORE: 0

## 2019-04-03 RX ORDER — PHENAZOPYRIDINE HYDROCHLORIDE 200 MG/1
200 TABLET, FILM COATED ORAL ONCE
Status: CANCELLED | OUTPATIENT
Start: 2019-04-03 | End: 2019-04-03

## 2019-04-03 NOTE — H&P
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  She had a normal spontaneous vaginal delivery 14 years ago.  There were no complications.  She had a miscarriage 12 years ago and again does not report any complications.  For the last 4 years her cycles have been progressively lighter.  She had a hysteroscopy on 2/.21 that showed significant Asherman syndrome.  She does not believe that any treatment was undertaken at that time.        No LMP recorded (lmp unknown)..   Patient is not sexually active, No obstetric history on file..  Using not sexually active for contraception.    reports that  has never smoked. she has never used smokeless tobacco.     STD testing offered?  Declined     Health maintenance updated:  yes     Today's PHQ-2 Score: No flowsheet data found.  Today's PHQ-9 Score: No flowsheet data found.  Today's ZACHARY-7 Score: No flowsheet data found.     Problem list and histories reviewed & adjusted, as indicated.  Additional history: as documented.     There is no problem list on file for this patient.    No past surgical history on file.   Social History            Tobacco Use     Smoking status: Never Smoker     Smokeless tobacco: Never Used   Substance Use Topics     Alcohol use: No       Frequency: Never                 Current Outpatient Medications   Medication Sig     vitamin D3 (D3-50) 05859 units capsule Take 50,000 Units by  "mouth      No current facility-administered medications for this visit.       No Known Allergies     ROS:  12 point review of systems negative other than symptoms noted below.     OBJECTIVE:      /60   Pulse 64   Ht 1.753 m (5' 9\")   Wt 92.1 kg (203 lb)   LMP  (LMP Unknown)   Breastfeeding? No   BMI 29.98 kg/m    Body mass index is 29.98 kg/m .     Exam:  Constitutional:  Appearance: Well nourished, well developed alert, in no acute distress  Neck:  Lymph Nodes:  No lymphadenopathy present; Thyroid:  Gland size normal, nontender, no nodules or masses present on palpation  Chest:  Respiratory Effort:  Breathing unlabored  Cardiovascular: Heart: Auscultation:  Regular rate, normal rhythm, no murmurs present  Gastrointestinal:  Abdominal Examination:  Abdomen nontender to palpation, tone normal without rigidity or guarding, no masses present, umbilicus without lesions; Liver/Spleen:  No hepatomegaly present, liver nontender to palpation; Hernias:  No hernias present  Lymphatic: Lymph Nodes:  No other lymphadenopathy present  Skin:General Inspection:  No rashes present, no lesions present, no areas of discoloration; Genitalia and Groin:  No rashes present, no lesions present, no areas of discoloration, no masses present.  Neurologic/Psychiatric:  Mental Status:  Oriented X3   Pelvic Exam:  External Genitalia:                Normal appearance for age, no discharge present, no tenderness present, no inflammatory lesions present, color normal  Vagina:                Normal vaginal vault without central or paravaginal defects, no discharge present, no inflammatory lesions present, no masses present  Bladder:                Nontender to palpation  Urethra:              Urethral Body:  Urethra palpation normal, urethra structural support normal              Urethral Meatus:  No erythema or lesions present  Cervix:                Appearance healthy, no lesions present, nontender to palpation, no bleeding " "present  Uterus:                Uterus: firm, normal sized and nontender, midplane in position.   Adnexa:                No adnexal tenderness present, no adnexal masses present  Perineum:                Perineum within normal limits, no evidence of trauma, no rashes or skin lesions present  Anus:                Anus within normal limits, no hemorrhoids present  Inguinal Lymph Nodes:                No lymphadenopathy present  Pubic Hair:                Normal pubic hair distribution for age  Genitalia and Groin:                No rashes present, no lesions present, no areas of discoloration, no masses present        In-Clinic Test Results:        ASSESSMENT/PLAN:                                                       34-year-old  2 para 1011 with a recent diagnosis of Asherman syndrome.  This lines up with a progressive decrease in the amount of menses that she has been experiencing over the last 4 years.  She has no clear infectious etiology or complication from her miscarriage and to account for this scarring.              Paulo Woodruff MD  [facility] [place]      Instructions      After Visit Summary (Automatic SnapShot taken 3/20/2019)   Additional Documentation     Vitals:    /60    Pulse 64    Ht 1.753 m (5' 9\")    Wt 92.1 kg (203 lb)    LMP  (LMP Unknown)    Breastfeeding? No    BMI 29.98 kg/m     BSA 2.12 m          More Vitals    Flowsheets:    Lactation,    Vitals Reassessment,    NICU VS,    Anthropometrics,    Ambulatory Assessments       Encounter Info:    Billing Info,    History,    Allergies,    Detailed Report       AVS Reports     Date/Time Report Action User   3/20/2019  3:38 PM After Visit Summary Automatically Generated Paulo Woodruff MD   Encounter Information      Provider Department Encounter # Center   3/20/2019 2:30 PM Paulo Woodruff MD; [facility] [facility] [id number] [place] [facility]   Reviewed this Encounter      Medications Problems " Allergies History   Paulo Woodruff MD   Reviewed Family, Medical, Surgical, Tobacco   Aishwarya Nguyen CMA   Reviewed Alcohol, Drug Use, Obstetric, Sexual Activity, Surgical, Tobacco   Orders Placed      [place]-Operative Worksheet   Medication Changes        None      Medication List   Visit Diagnoses         Asherman's syndrome      Problem List

## 2019-04-04 ENCOUNTER — ANESTHESIA EVENT (OUTPATIENT)
Dept: SURGERY | Facility: CLINIC | Age: 34
End: 2019-04-04
Payer: COMMERCIAL

## 2019-04-04 ENCOUNTER — HOSPITAL ENCOUNTER (OUTPATIENT)
Facility: CLINIC | Age: 34
Discharge: HOME | End: 2019-04-04
Attending: OBSTETRICS & GYNECOLOGY | Admitting: OBSTETRICS & GYNECOLOGY
Payer: COMMERCIAL

## 2019-04-04 ENCOUNTER — ANESTHESIA (OUTPATIENT)
Dept: SURGERY | Facility: CLINIC | Age: 34
End: 2019-04-04
Payer: COMMERCIAL

## 2019-04-04 ENCOUNTER — TELEPHONE (OUTPATIENT)
Dept: OBGYN | Facility: CLINIC | Age: 34
End: 2019-04-04

## 2019-04-04 ENCOUNTER — SURGERY (OUTPATIENT)
Age: 34
End: 2019-04-04
Payer: COMMERCIAL

## 2019-04-04 VITALS
RESPIRATION RATE: 18 BRPM | BODY MASS INDEX: 30.27 KG/M2 | HEIGHT: 69 IN | OXYGEN SATURATION: 100 % | SYSTOLIC BLOOD PRESSURE: 119 MMHG | DIASTOLIC BLOOD PRESSURE: 81 MMHG | TEMPERATURE: 97.8 F | HEART RATE: 66 BPM | WEIGHT: 204.4 LBS

## 2019-04-04 DIAGNOSIS — N85.6 ASHERMAN'S SYNDROME: Primary | ICD-10-CM

## 2019-04-04 LAB — B-HCG SERPL-ACNC: <1 IU/L (ref 0–5)

## 2019-04-04 PROCEDURE — 40000170 ZZH STATISTIC PRE-PROCEDURE ASSESSMENT II: Performed by: OBSTETRICS & GYNECOLOGY

## 2019-04-04 PROCEDURE — 58559 HYSTEROSCOPY LYSIS: CPT | Performed by: OBSTETRICS & GYNECOLOGY

## 2019-04-04 PROCEDURE — 88312 SPECIAL STAINS GROUP 1: CPT | Performed by: OBSTETRICS & GYNECOLOGY

## 2019-04-04 PROCEDURE — 25000566 ZZH SEVOFLURANE, EA 15 MIN: Performed by: OBSTETRICS & GYNECOLOGY

## 2019-04-04 PROCEDURE — 25000125 ZZHC RX 250: Performed by: OBSTETRICS & GYNECOLOGY

## 2019-04-04 PROCEDURE — 88305 TISSUE EXAM BY PATHOLOGIST: CPT | Performed by: OBSTETRICS & GYNECOLOGY

## 2019-04-04 PROCEDURE — 36000058 ZZH SURGERY LEVEL 3 EA 15 ADDTL MIN: Performed by: OBSTETRICS & GYNECOLOGY

## 2019-04-04 PROCEDURE — 25000128 H RX IP 250 OP 636: Performed by: NURSE ANESTHETIST, CERTIFIED REGISTERED

## 2019-04-04 PROCEDURE — 25800030 ZZH RX IP 258 OP 636: Performed by: NURSE ANESTHETIST, CERTIFIED REGISTERED

## 2019-04-04 PROCEDURE — 71000027 ZZH RECOVERY PHASE 2 EACH 15 MINS: Performed by: OBSTETRICS & GYNECOLOGY

## 2019-04-04 PROCEDURE — 25800025 ZZH RX 258: Performed by: OBSTETRICS & GYNECOLOGY

## 2019-04-04 PROCEDURE — 37000008 ZZH ANESTHESIA TECHNICAL FEE, 1ST 30 MIN: Performed by: OBSTETRICS & GYNECOLOGY

## 2019-04-04 PROCEDURE — 84702 CHORIONIC GONADOTROPIN TEST: CPT | Performed by: OBSTETRICS & GYNECOLOGY

## 2019-04-04 PROCEDURE — 71000012 ZZH RECOVERY PHASE 1 LEVEL 1 FIRST HR: Performed by: OBSTETRICS & GYNECOLOGY

## 2019-04-04 PROCEDURE — 36415 COLL VENOUS BLD VENIPUNCTURE: CPT | Performed by: OBSTETRICS & GYNECOLOGY

## 2019-04-04 PROCEDURE — 36000056 ZZH SURGERY LEVEL 3 1ST 30 MIN: Performed by: OBSTETRICS & GYNECOLOGY

## 2019-04-04 PROCEDURE — 37000009 ZZH ANESTHESIA TECHNICAL FEE, EACH ADDTL 15 MIN: Performed by: OBSTETRICS & GYNECOLOGY

## 2019-04-04 PROCEDURE — 25000125 ZZHC RX 250: Performed by: NURSE ANESTHETIST, CERTIFIED REGISTERED

## 2019-04-04 PROCEDURE — 27210794 ZZH OR GENERAL SUPPLY STERILE: Performed by: OBSTETRICS & GYNECOLOGY

## 2019-04-04 PROCEDURE — 25000128 H RX IP 250 OP 636: Performed by: OBSTETRICS & GYNECOLOGY

## 2019-04-04 RX ORDER — DEXAMETHASONE SODIUM PHOSPHATE 4 MG/ML
INJECTION, SOLUTION INTRA-ARTICULAR; INTRALESIONAL; INTRAMUSCULAR; INTRAVENOUS; SOFT TISSUE PRN
Status: DISCONTINUED | OUTPATIENT
Start: 2019-04-04 | End: 2019-04-04

## 2019-04-04 RX ORDER — CEFAZOLIN SODIUM 2 G/100ML
2 INJECTION, SOLUTION INTRAVENOUS
Status: COMPLETED | OUTPATIENT
Start: 2019-04-04 | End: 2019-04-04

## 2019-04-04 RX ORDER — PROPOFOL 10 MG/ML
INJECTION, EMULSION INTRAVENOUS PRN
Status: DISCONTINUED | OUTPATIENT
Start: 2019-04-04 | End: 2019-04-04

## 2019-04-04 RX ORDER — EPHEDRINE SULFATE 50 MG/ML
INJECTION, SOLUTION INTRAMUSCULAR; INTRAVENOUS; SUBCUTANEOUS PRN
Status: DISCONTINUED | OUTPATIENT
Start: 2019-04-04 | End: 2019-04-04

## 2019-04-04 RX ORDER — NALOXONE HYDROCHLORIDE 0.4 MG/ML
.1-.4 INJECTION, SOLUTION INTRAMUSCULAR; INTRAVENOUS; SUBCUTANEOUS
Status: DISCONTINUED | OUTPATIENT
Start: 2019-04-04 | End: 2019-04-04 | Stop reason: HOSPADM

## 2019-04-04 RX ORDER — SODIUM CHLORIDE, SODIUM LACTATE, POTASSIUM CHLORIDE, CALCIUM CHLORIDE 600; 310; 30; 20 MG/100ML; MG/100ML; MG/100ML; MG/100ML
INJECTION, SOLUTION INTRAVENOUS CONTINUOUS PRN
Status: DISCONTINUED | OUTPATIENT
Start: 2019-04-04 | End: 2019-04-04

## 2019-04-04 RX ORDER — PROPOFOL 10 MG/ML
INJECTION, EMULSION INTRAVENOUS CONTINUOUS PRN
Status: DISCONTINUED | OUTPATIENT
Start: 2019-04-04 | End: 2019-04-04

## 2019-04-04 RX ORDER — HYDROCODONE BITARTRATE AND ACETAMINOPHEN 5; 325 MG/1; MG/1
1-2 TABLET ORAL EVERY 4 HOURS PRN
Qty: 10 TABLET | Refills: 0 | Status: SHIPPED | OUTPATIENT
Start: 2019-04-04 | End: 2019-05-06

## 2019-04-04 RX ORDER — MEPERIDINE HYDROCHLORIDE 25 MG/ML
12.5 INJECTION INTRAMUSCULAR; INTRAVENOUS; SUBCUTANEOUS
Status: DISCONTINUED | OUTPATIENT
Start: 2019-04-04 | End: 2019-04-04 | Stop reason: HOSPADM

## 2019-04-04 RX ORDER — HYDROCODONE BITARTRATE AND ACETAMINOPHEN 5; 325 MG/1; MG/1
1 TABLET ORAL
Status: DISCONTINUED | OUTPATIENT
Start: 2019-04-04 | End: 2019-04-04 | Stop reason: HOSPADM

## 2019-04-04 RX ORDER — LIDOCAINE HYDROCHLORIDE 20 MG/ML
INJECTION, SOLUTION INFILTRATION; PERINEURAL PRN
Status: DISCONTINUED | OUTPATIENT
Start: 2019-04-04 | End: 2019-04-04

## 2019-04-04 RX ORDER — ONDANSETRON 2 MG/ML
INJECTION INTRAMUSCULAR; INTRAVENOUS PRN
Status: DISCONTINUED | OUTPATIENT
Start: 2019-04-04 | End: 2019-04-04

## 2019-04-04 RX ORDER — SODIUM CHLORIDE, SODIUM LACTATE, POTASSIUM CHLORIDE, CALCIUM CHLORIDE 600; 310; 30; 20 MG/100ML; MG/100ML; MG/100ML; MG/100ML
INJECTION, SOLUTION INTRAVENOUS CONTINUOUS
Status: DISCONTINUED | OUTPATIENT
Start: 2019-04-04 | End: 2019-04-04 | Stop reason: HOSPADM

## 2019-04-04 RX ORDER — FENTANYL CITRATE 50 UG/ML
25-50 INJECTION, SOLUTION INTRAMUSCULAR; INTRAVENOUS
Status: DISCONTINUED | OUTPATIENT
Start: 2019-04-04 | End: 2019-04-04 | Stop reason: HOSPADM

## 2019-04-04 RX ORDER — FENTANYL CITRATE 50 UG/ML
INJECTION, SOLUTION INTRAMUSCULAR; INTRAVENOUS PRN
Status: DISCONTINUED | OUTPATIENT
Start: 2019-04-04 | End: 2019-04-04

## 2019-04-04 RX ORDER — DEXAMETHASONE SODIUM PHOSPHATE 4 MG/ML
4 INJECTION, SOLUTION INTRA-ARTICULAR; INTRALESIONAL; INTRAMUSCULAR; INTRAVENOUS; SOFT TISSUE EVERY 10 MIN PRN
Status: DISCONTINUED | OUTPATIENT
Start: 2019-04-04 | End: 2019-04-04 | Stop reason: HOSPADM

## 2019-04-04 RX ORDER — KETOROLAC TROMETHAMINE 30 MG/ML
INJECTION, SOLUTION INTRAMUSCULAR; INTRAVENOUS PRN
Status: DISCONTINUED | OUTPATIENT
Start: 2019-04-04 | End: 2019-04-04

## 2019-04-04 RX ORDER — CEFAZOLIN SODIUM 1 G/3ML
1 INJECTION, POWDER, FOR SOLUTION INTRAMUSCULAR; INTRAVENOUS SEE ADMIN INSTRUCTIONS
Status: DISCONTINUED | OUTPATIENT
Start: 2019-04-04 | End: 2019-04-04 | Stop reason: HOSPADM

## 2019-04-04 RX ORDER — HYDRALAZINE HYDROCHLORIDE 20 MG/ML
2.5-5 INJECTION INTRAMUSCULAR; INTRAVENOUS EVERY 10 MIN PRN
Status: DISCONTINUED | OUTPATIENT
Start: 2019-04-04 | End: 2019-04-04 | Stop reason: HOSPADM

## 2019-04-04 RX ORDER — MAGNESIUM HYDROXIDE 1200 MG/15ML
LIQUID ORAL PRN
Status: DISCONTINUED | OUTPATIENT
Start: 2019-04-04 | End: 2019-04-04 | Stop reason: HOSPADM

## 2019-04-04 RX ORDER — ONDANSETRON 4 MG/1
4 TABLET, ORALLY DISINTEGRATING ORAL EVERY 30 MIN PRN
Status: DISCONTINUED | OUTPATIENT
Start: 2019-04-04 | End: 2019-04-04 | Stop reason: HOSPADM

## 2019-04-04 RX ORDER — KETOROLAC TROMETHAMINE 30 MG/ML
30 INJECTION, SOLUTION INTRAMUSCULAR; INTRAVENOUS EVERY 6 HOURS PRN
Status: DISCONTINUED | OUTPATIENT
Start: 2019-04-04 | End: 2019-04-04 | Stop reason: HOSPADM

## 2019-04-04 RX ORDER — ONDANSETRON 2 MG/ML
4 INJECTION INTRAMUSCULAR; INTRAVENOUS EVERY 30 MIN PRN
Status: DISCONTINUED | OUTPATIENT
Start: 2019-04-04 | End: 2019-04-04 | Stop reason: HOSPADM

## 2019-04-04 RX ORDER — LABETALOL 20 MG/4 ML (5 MG/ML) INTRAVENOUS SYRINGE
10
Status: DISCONTINUED | OUTPATIENT
Start: 2019-04-04 | End: 2019-04-04 | Stop reason: HOSPADM

## 2019-04-04 RX ORDER — HYDROMORPHONE HYDROCHLORIDE 1 MG/ML
.3-.5 INJECTION, SOLUTION INTRAMUSCULAR; INTRAVENOUS; SUBCUTANEOUS EVERY 10 MIN PRN
Status: DISCONTINUED | OUTPATIENT
Start: 2019-04-04 | End: 2019-04-04 | Stop reason: HOSPADM

## 2019-04-04 RX ADMIN — ONDANSETRON 4 MG: 2 INJECTION INTRAMUSCULAR; INTRAVENOUS at 12:02

## 2019-04-04 RX ADMIN — LIDOCAINE HYDROCHLORIDE 100 MG: 20 INJECTION, SOLUTION INFILTRATION; PERINEURAL at 11:51

## 2019-04-04 RX ADMIN — SODIUM CHLORIDE 1000 ML: 900 IRRIGANT IRRIGATION at 12:05

## 2019-04-04 RX ADMIN — PHENYLEPHRINE HYDROCHLORIDE: 10 INJECTION, SOLUTION INTRAMUSCULAR; INTRAVENOUS; SUBCUTANEOUS at 11:59

## 2019-04-04 RX ADMIN — KETOROLAC TROMETHAMINE 30 MG: 30 INJECTION, SOLUTION INTRAMUSCULAR at 12:16

## 2019-04-04 RX ADMIN — MIDAZOLAM 1 MG: 1 INJECTION INTRAMUSCULAR; INTRAVENOUS at 11:48

## 2019-04-04 RX ADMIN — Medication 5 MG: at 11:59

## 2019-04-04 RX ADMIN — PROPOFOL 180 MCG/KG/MIN: 10 INJECTION, EMULSION INTRAVENOUS at 11:51

## 2019-04-04 RX ADMIN — SODIUM CHLORIDE 1000 ML: 900 IRRIGANT IRRIGATION at 12:07

## 2019-04-04 RX ADMIN — FENTANYL CITRATE 25 MCG: 50 INJECTION, SOLUTION INTRAMUSCULAR; INTRAVENOUS at 11:48

## 2019-04-04 RX ADMIN — PROPOFOL 200 MG: 10 INJECTION, EMULSION INTRAVENOUS at 11:51

## 2019-04-04 RX ADMIN — SODIUM CHLORIDE 1000 ML: 900 IRRIGANT IRRIGATION at 12:04

## 2019-04-04 RX ADMIN — DEXAMETHASONE SODIUM PHOSPHATE 4 MG: 4 INJECTION, SOLUTION INTRA-ARTICULAR; INTRALESIONAL; INTRAMUSCULAR; INTRAVENOUS; SOFT TISSUE at 12:02

## 2019-04-04 RX ADMIN — DEXMEDETOMIDINE HYDROCHLORIDE 8 MCG: 100 INJECTION, SOLUTION INTRAVENOUS at 12:09

## 2019-04-04 RX ADMIN — DEXMEDETOMIDINE HYDROCHLORIDE 12 MCG: 100 INJECTION, SOLUTION INTRAVENOUS at 11:48

## 2019-04-04 RX ADMIN — SODIUM CHLORIDE, POTASSIUM CHLORIDE, SODIUM LACTATE AND CALCIUM CHLORIDE: 600; 310; 30; 20 INJECTION, SOLUTION INTRAVENOUS at 11:46

## 2019-04-04 RX ADMIN — CEFAZOLIN SODIUM 2 G: 2 INJECTION, SOLUTION INTRAVENOUS at 11:51

## 2019-04-04 ASSESSMENT — MIFFLIN-ST. JEOR: SCORE: 1681.53

## 2019-04-04 NOTE — ANESTHESIA CARE TRANSFER NOTE
Patient: Bob Brambila    Procedure(s):  HYSTEROSCOPY, RESECTION OF INTRAUTERINE ADHESIONS    Diagnosis: ASHERMANS SYNDROME  Diagnosis Additional Information: No value filed.    Anesthesia Type:   General, LMA     Note:  Airway :Face Mask  Patient transferred to:PACU  Comments: At end of procedure, spontaneous respirations, adequate tidal volumes, followed commands to voice, LMA removed atraumatically, oropharynx suctioned, airway patent after LMA removal. Oxygen via facemask at 6 liters per minute to PACU. Oxygen tubing connected to wall O2 in PACU, SpO2, NiBP, and EKG monitors and alarms on and functioning, May Hugger warmer connected to patient gown, report on patient's clinical status given to PACU RN, RN questions answered.Handoff Report: Identifed the Patient, Identified the Reponsible Provider, Reviewed the pertinent medical history, Discussed the surgical course, Reviewed Intra-OP anesthesia mangement and issues during anesthesia, Set expectations for post-procedure period and Allowed opportunity for questions and acknowledgement of understanding      Vitals: (Last set prior to Anesthesia Care Transfer)    CRNA VITALS  4/4/2019 1159 - 4/4/2019 1234      4/4/2019             NIBP:  122/90    Pulse:  72    NIBP Mean:  107    SpO2:  100 %    Resp Rate (set):  10                Electronically Signed By: SARA Cheung CRNA  April 4, 2019  12:34 PM

## 2019-04-04 NOTE — ANESTHESIA PREPROCEDURE EVALUATION
"Anesthesia Pre-Procedure Evaluation    Patient: Bob Brambila   MRN: [id number] : 1985          Preoperative Diagnosis: ASHERMANS SYNDROME    Procedure(s):  HYSTEROSCOPY, ABLATE ENDOMETRIUM VERSAPOINT (LANGUAGE : Armenian)    History reviewed. No pertinent past medical history.  Past Surgical History:   Procedure Laterality Date     ENT SURGERY      ear surgery     HYSTEROSCOPY  2019    Asherman's       Anesthesia Evaluation     . Pt has had prior anesthetic.     No history of anesthetic complications          ROS/MED HX    ENT/Pulmonary:  - neg pulmonary ROS    (-) sleep apnea   Neurologic:  - neg neurologic ROS     Cardiovascular:  - neg cardiovascular ROS       METS/Exercise Tolerance:     Hematologic:         Musculoskeletal:         GI/Hepatic:  - neg GI/hepatic ROS      (-) GERD   Renal/Genitourinary:  - ROS Renal section negative       Endo:     (+) Obesity, .      Psychiatric:         Infectious Disease:         Malignancy:         Other:                          Physical Exam  Normal systems: cardiovascular, pulmonary and dental    Airway   Mallampati: II  TM distance: >3 FB  Neck ROM: full    Dental     Cardiovascular       Pulmonary             No results found for: WBC, HGB, HCT, PLT, CRP, SED, NA, POTASSIUM, CHLORIDE, CO2, BUN, CR, GLC, DAVID, PHOS, MAG, ALBUMIN, PROTTOTAL, ALT, AST, GGT, ALKPHOS, BILITOTAL, BILIDIRECT, LIPASE, AMYLASE, ARDEN, PTT, INR, FIBR, TSH, T4, T3, HCG, HCGS, CKTOTAL, CKMB, TROPN    Preop Vitals  BP Readings from Last 3 Encounters:   19 125/80   19 104/60    Pulse Readings from Last 3 Encounters:   19 70   19 64      Resp Readings from Last 3 Encounters:   19 16    SpO2 Readings from Last 3 Encounters:   19 100%      Temp Readings from Last 1 Encounters:   19 36.8  C (98.2  F) (Oral)    Ht Readings from Last 1 Encounters:   19 1.753 m (5' 9\")      Wt Readings from Last 1 Encounters:   19 92.7 kg (204 lb 6.4 oz)    " "Estimated body mass index is 30.18 kg/m  as calculated from the following:    Height as of this encounter: 1.753 m (5' 9\").    Weight as of this encounter: 92.7 kg (204 lb 6.4 oz).       Anesthesia Plan      History & Physical Review  History and physical reviewed and following examination; no interval change.    ASA Status:  1 .    NPO Status:  > 8 hours    Plan for General and LMA with Intravenous and Propofol induction. Maintenance will be TIVA.    PONV prophylaxis:  Ondansetron (or other 5HT-3) and Dexamethasone or Solumedrol       Postoperative Care  Postoperative pain management:  Multi-modal analgesia.      Consents  Anesthetic plan, risks, benefits and alternatives discussed with:  Patient..                 Fitz Gray MD  "

## 2019-04-04 NOTE — OR NURSING
PNDS met, po per I&O sheet. Pt dressed, up in recliner and transported to Phase 2. [place]  remains at bedside. Friend is coming from [place]. Patient states he speaks and understands English.

## 2019-04-04 NOTE — DISCHARGE INSTRUCTIONS
Same Day Surgery Discharge Instructions for  Sedation and General Anesthesia       It's not unusual to feel dizzy, light-headed or faint for up to 24 hours after surgery or while taking pain medication.  If you have these symptoms: sit for a few minutes before standing and have someone assist you when you get up to walk or use the bathroom.      You should rest and relax for the next 24 hours. We recommend you make arrangements to have an adult stay with you for at least 24 hours after your discharge.  Avoid hazardous and strenuous activity.      DO NOT DRIVE any vehicle or operate mechanical equipment for 24 hours following the end of your surgery.  Even though you may feel normal, your reactions may be affected by the medication you have received.      Do not drink alcoholic beverages for 24 hours following surgery.       Slowly progress to your regular diet as you feel able. It's not unusual to feel nauseated and/or vomit after receiving anesthesia.  If you develop these symptoms, drink clear liquids (apple juice, ginger ale, broth, 7-up, etc. ) until you feel better.  If your nausea and vomiting persists for 24 hours, please notify your surgeon.        All narcotic pain medications, along with inactivity and anesthesia, can cause constipation. Drinking plenty of liquids and increasing fiber intake will help.      For any questions of a medical nature, call your surgeon.      Do not make important decisions for 24 hours.      If you had general anesthesia, you may have a sore throat for a couple of days related to the breathing tube used during surgery.  You may use Cepacol lozenges to help with this discomfort.  If it worsens or if you develop a fever, contact your surgeon.       If you feel your pain is not well managed with the pain medications prescribed by your surgeon, please contact your surgeon's office to let them know so they can address your concerns.     Today you received Toradol, an antiinflammatory  medication similar to Ibuprofen.  You should not take other antiinflammatory medication, such as Ibuprofen, Motrin, Advil, Aleve, Naprosyn, etc until 6:15pm.         [facility]  Discharge Instructions  Following D & C / Hysteroscopy    Activity  You may resume normal activities including lifting as needed.  It is permissible to climb stairs. You may drive after 24 hours as long as you are not taking narcotic pain pills.  Baths or showers are perfectly acceptable.      Vaginal Discharge  You may have some vaginal bleeding or discharge for about a week after procedure.  You may use tampons or pads.    Temperature  If you develop temperature elevations to over 101  Fahrenheit, your physician should be called immediately.    Diet  [place] or light diet is advisable the day of surgery.  If nausea persists, continue this diet.  If severe, call.    Follow-up  Make an appointment in 1-2 weeks if instructed to at: [phone]        [facility]  [phone]      **If you have questions or concerns about your procedure,   call Dr. Woodruff at [phone]**

## 2019-04-04 NOTE — OR NURSING
Instructions completed with patient with  @ 1420 hours before departure of . Pharmacy contacted re: medications.  They states Premarin not covered under insurance.  Dr. Woodruff contacted about RX change.  [facility] called to [facility] pharmacy in [place].  Ride here @ 1530 hours  Instructions completed with friend/ , filled Norco prescription given to patient/ friend.  To car via wheelchair.

## 2019-04-04 NOTE — BRIEF OP NOTE
[facility] Brief Operative Note    Pre-operative diagnosis: ASHERMANS SYNDROME   Post-operative diagnosis EXTENSIVE INTRAUTERINE SCARRING   Procedure: Procedure(s):  HYSTEROSCOPY, RESECTION OF INTRAUTERINE ADHESIONS   Surgeon(s): Surgeon(s) and Role:     * Paulo Woodruff MD - Primary   Estimated blood loss: * No values recorded between 4/4/2019 12:02 PM and 4/4/2019 12:20 PM *    Specimens: ID Type Source Tests Collected by Time Destination   A : INTRA-UTERINE ADHESIONS Tissue Uterus SURGICAL PATHOLOGY EXAM Paulo Woodruff MD 4/4/2019 12:11 PM       Findings: SEE OP NOTE

## 2019-04-04 NOTE — ANESTHESIA POSTPROCEDURE EVALUATION
Patient: Bob Brambila    Procedure(s):  HYSTEROSCOPY, RESECTION OF INTRAUTERINE ADHESIONS    Diagnosis:ASHERMANS SYNDROME  Diagnosis Additional Information: No value filed.    Anesthesia Type:  General, LMA    Note:  Anesthesia Post Evaluation    Patient location during evaluation: PACU  Patient participation: Able to fully participate in evaluation  Level of consciousness: awake and alert  Pain management: adequate  Airway patency: patent  Cardiovascular status: acceptable  Respiratory status: acceptable and unassisted  Hydration status: acceptable  PONV: none             Last vitals:  Vitals:    04/04/19 1232 04/04/19 1245 04/04/19 1300   BP: 120/76 113/75 114/82   Pulse: 70 69 66   Resp: 16 16 16   Temp: 36.1  C (96.9  F)     SpO2: 100% 100% 100%         Electronically Signed By: Fitz Gray MD  April 4, 2019  1:13 PM

## 2019-04-04 NOTE — OP NOTE
Procedure Date: 2019      REASON FOR ADMISSION:  Asherman syndrome.      PROCEDURE:  Hysteroscopy, extensive lysis of intrauterine adhesions.      OPERATIVE FINDINGS:  The patient had scarring and entrapment of the entire upper endometrial cavity.  After resection of scar tissue, we were able to clearly visualize the right fallopian tube and see streaming of fluid.  There was still some tissue obscuring the left ostia and I could not identify patency there.  The cavity was clean all the way to the lower uterine segment.      OPERATIVE PROCEDURE:  After general anesthesia was induced, the patient was placed in the dorsal lithotomy position and prepped and draped in the usual fashion.  The cervix was grasped and endocervical canal carefully dilated.  The hysteroscope was placed.  The above findings were noted.  With a grasping forceps, we carefully dissected planes and removed post-inflammatory scar tissue.  This occluded the upper half of the endometrial cavity and obscured both cornu.  Once the adhesions were taken down, we could easily identify the right tubal ostia and see streaming of fluid.  We could see the left cornu but could not clearly see an ostia on the left.  The cavity was completely cleaned at this time.  The decision was made to back out.  The patient tolerated this well and went to the recovery room.  She will be discharged to home and placed on Premarin for 21 days to try to reestablish some normal endometrium.  She will return to see us in 2 weeks for a postoperative check.  She will need an ultrasound and potentially a hystersalpingogram down the road or repeat hysteroscopy if we are concerned about further scarring.         MARLENE LY JR, MD             D: 2019   T: 2019   MT: CHELLY      Name:     CARI BEAVER   MRN:      [id number]        Account:        [id number]   :      1985           Procedure Date: 2019      Document: [id number]

## 2019-04-08 LAB — COPATH REPORT: NORMAL

## 2019-05-06 ENCOUNTER — OFFICE VISIT (OUTPATIENT)
Dept: OBGYN | Facility: CLINIC | Age: 34
End: 2019-05-06
Payer: COMMERCIAL

## 2019-05-06 VITALS
SYSTOLIC BLOOD PRESSURE: 106 MMHG | BODY MASS INDEX: 30.07 KG/M2 | WEIGHT: 203 LBS | HEART RATE: 72 BPM | HEIGHT: 69 IN | DIASTOLIC BLOOD PRESSURE: 64 MMHG

## 2019-05-06 DIAGNOSIS — Z09 POSTOP CHECK: Primary | ICD-10-CM

## 2019-05-06 PROCEDURE — 99212 OFFICE O/P EST SF 10 MIN: CPT | Performed by: OBSTETRICS & GYNECOLOGY

## 2019-05-06 ASSESSMENT — MIFFLIN-ST. JEOR: SCORE: 1675.18

## 2019-05-06 NOTE — PROGRESS NOTES
The patient is seen at this time for follow-up after a hysteroscopy with lysis of multiple intrauterine adhesions.  The right tubal ostia was identified with streaming present.  It appeared that there was still some scarring of the left side or just spasm.  The patient has a past history of amenorrhea after a long history of irregular cycles.  We had a long discussion as to what ever process caused all the scarring and how that may have damaged the endometrial lining.  She is trying to get pregnant but I have asked her to at least wait for the next 2 to 3 months to see if she establishes menses.

## 2019-05-07 ENCOUNTER — TELEPHONE (OUTPATIENT)
Dept: OBGYN | Facility: CLINIC | Age: 34
End: 2019-05-07

## 2019-05-07 NOTE — TELEPHONE ENCOUNTER
Please abstract the following data from this visit with this patient into the appropriate field in Epic: Care Everywhere    Pap smear done on this date: 8/01/2017 (approximately), by this group: [facility], results were NIL, HPV-.

## 2019-12-13 NOTE — PROGRESS NOTES
SUBJECTIVE:                                                   Bob Brambila is a 34 year old female who presents to clinic today for the following health issue(s):  Patient presents with:  Follow Up: irregular periods      HPI: The patient is seen at this time for irregular periods.  She bleeds every month but it can be spotting to moderate.  But the timing can be anywhere from 2 weeks to 4 weeks.  She is concerned about fertility capacity.  We have talked with her in the past about a hysterosalpingogram to check her tubes.      Patient's last menstrual period was 2019..     Patient is not sexually active, .  Using not sexually active for contraception.    reports that she has never smoked. She has never used smokeless tobacco.    STD testing offered?  Declined    Health maintenance updated:  yes    Today's PHQ-2 Score:   PHQ-2 (  [employer]) 2019   Q1: Little interest or pleasure in doing things 0   Q2: Feeling down, depressed or hopeless 0   PHQ-2 Score 0     Today's PHQ-9 Score:   PHQ-9 SCORE 3/20/2019   PHQ-9 Total Score 0     Today's ZACHARY-7 Score:   ZACHARY-7 SCORE 3/20/2019   Total Score 0       Problem list and histories reviewed & adjusted, as indicated.  Additional history: as documented.    There is no problem list on file for this patient.    Past Surgical History:   Procedure Laterality Date     ENT SURGERY      ear surgery     HYSTEROSCOPY  2019    Asherman's     HYSTEROSCOPY DIAGNOSTIC N/A 2019    Procedure: HYSTEROSCOPY, RESECTION OF INTRAUTERINE ADHESIONS;  Surgeon: Paulo Woodruff MD;  Location:  OR      Social History     Tobacco Use     Smoking status: Never Smoker     Smokeless tobacco: Never Used   Substance Use Topics     Alcohol use: No     Frequency: Never      Problem (# of Occurrences) Relation (Name,Age of Onset)    No Known Problems (6) Mother, Father, Maternal Grandmother, Maternal Grandfather, Paternal Grandmother, Paternal Grandfather            Current  "Outpatient Medications   Medication Sig     estrogen conj (PREMARIN) 1.25 MG tablet Take 1 tablet (1.25 mg) by mouth daily for 21 days     No current facility-administered medications for this visit.      No Known Allergies    ROS:  12 point review of systems negative other than symptoms noted below or in the HPI.  Genitourinary: Irregular Menses  No urinary frequency or dysuria, bladder or kidney problems      OBJECTIVE:     /68   Pulse 72   Ht 1.753 m (5' 9\")   Wt 94.3 kg (208 lb)   LMP 12/14/2019   BMI 30.72 kg/m    Body mass index is 30.72 kg/m .    Exam:  Constitutional:  Appearance: Well nourished, well developed alert, in no acute distress  Gastrointestinal:  Abdominal Examination:  Abdomen nontender to palpation, tone normal without rigidity or guarding, no masses present, umbilicus without lesions; Liver/Spleen:  No hepatomegaly present, liver nontender to palpation; Hernias:  No hernias present  Lymphatic: Lymph Nodes:  No other lymphadenopathy present  Skin: General Inspection:  No rashes present, no lesions present, no areas of discoloration.  Neurologic:  Mental Status:  Oriented X3.  Normal strength and tone, sensory exam grossly normal, mentation intact and speech normal.    Psychiatric:  Mentation appears normal and affect normal/bright.  Pelvic Exam:  External Genitalia:     Normal appearance for age, no discharge present, no tenderness present, no inflammatory lesions present, color normal  Vagina:     Normal vaginal vault without central or paravaginal defects, no discharge present, no inflammatory lesions present, no masses present  Bladder:     Nontender to palpation  Urethra:   Urethral Body:  Urethra palpation normal, urethra structural support normal   Urethral Meatus:  No erythema or lesions present  Cervix:     Appearance healthy, no lesions present, nontender to palpation, no bleeding present  Uterus:     Uterus: firm, normal sized and nontender, midplane in position.   Adnexa:  "    No adnexal tenderness present, no adnexal masses present  Perineum:     Perineum within normal limits, no evidence of trauma, no rashes or skin lesions present  Anus:     Anus within normal limits, no hemorrhoids present  Inguinal Lymph Nodes:     No lymphadenopathy present  Pubic Hair:     Normal pubic hair distribution for age  Genitalia and Groin:     No rashes present, no lesions present, no areas of discoloration, no masses present       In-Clinic Test Results:      ASSESSMENT/PLAN:                                                        34-year-old female (31) with anovulatory dysfunctional uterine bleeding.  There is a question in the past about possible pelvic adhesive disease.  We will draw a set of thyroid functions, prolactin, and AMH.  She will need to have a hysterosalpingogram sometime in the future.  We will cycle her at this time with 10 days of Provera and see how she responds.        Paulo Woodruff MD  [facility] [place]

## 2019-12-18 ENCOUNTER — OFFICE VISIT (OUTPATIENT)
Dept: OBGYN | Facility: CLINIC | Age: 34
End: 2019-12-18
Payer: COMMERCIAL

## 2019-12-18 VITALS
DIASTOLIC BLOOD PRESSURE: 68 MMHG | SYSTOLIC BLOOD PRESSURE: 110 MMHG | WEIGHT: 208 LBS | HEART RATE: 72 BPM | HEIGHT: 69 IN | BODY MASS INDEX: 30.81 KG/M2

## 2019-12-18 DIAGNOSIS — Z11.3 SCREEN FOR STD (SEXUALLY TRANSMITTED DISEASE): Primary | ICD-10-CM

## 2019-12-18 DIAGNOSIS — Z11.8 SCREENING FOR CHLAMYDIAL DISEASE: ICD-10-CM

## 2019-12-18 DIAGNOSIS — N93.8 DUB (DYSFUNCTIONAL UTERINE BLEEDING): ICD-10-CM

## 2019-12-18 LAB
PROLACTIN SERPL-MCNC: 12 UG/L (ref 3–27)
TSH SERPL DL<=0.005 MIU/L-ACNC: 1.91 MU/L (ref 0.4–4)

## 2019-12-18 PROCEDURE — 99213 OFFICE O/P EST LOW 20 MIN: CPT | Performed by: OBSTETRICS & GYNECOLOGY

## 2019-12-18 PROCEDURE — 36415 COLL VENOUS BLD VENIPUNCTURE: CPT | Performed by: OBSTETRICS & GYNECOLOGY

## 2019-12-18 PROCEDURE — 84443 ASSAY THYROID STIM HORMONE: CPT | Performed by: OBSTETRICS & GYNECOLOGY

## 2019-12-18 PROCEDURE — 83520 IMMUNOASSAY QUANT NOS NONAB: CPT | Mod: 90 | Performed by: OBSTETRICS & GYNECOLOGY

## 2019-12-18 PROCEDURE — 99000 SPECIMEN HANDLING OFFICE-LAB: CPT | Performed by: OBSTETRICS & GYNECOLOGY

## 2019-12-18 PROCEDURE — 84146 ASSAY OF PROLACTIN: CPT | Performed by: OBSTETRICS & GYNECOLOGY

## 2019-12-18 RX ORDER — MEDROXYPROGESTERONE ACETATE 10 MG
10 TABLET ORAL DAILY
Qty: 10 TABLET | Refills: 1 | Status: SHIPPED | OUTPATIENT
Start: 2019-12-18 | End: 2019-12-28

## 2019-12-18 ASSESSMENT — MIFFLIN-ST. JEOR: SCORE: 1697.86

## 2019-12-20 LAB — MIS SERPL-MCNC: 0.25 NG/ML (ref 0.18–11.71)

## 2020-10-07 ENCOUNTER — HOSPITAL ENCOUNTER (EMERGENCY)
Facility: CLINIC | Age: 35
Discharge: LEFT BEFORE DISCHARGE | End: 2020-10-07
Payer: COMMERCIAL

## 2021-01-28 ENCOUNTER — TRANSFERRED RECORDS (OUTPATIENT)
Dept: HEALTH INFORMATION MANAGEMENT | Facility: CLINIC | Age: 36
End: 2021-01-28

## 2021-01-28 LAB — PAP-ABSTRACT: NORMAL

## 2021-01-30 ENCOUNTER — NURSE TRIAGE (OUTPATIENT)
Dept: NURSING | Facility: CLINIC | Age: 36
End: 2021-01-30

## 2021-01-31 NOTE — TELEPHONE ENCOUNTER
Miguel calls and says that she had her bottom left back tooth removed on last Wednesday, but still feels the tooth there. Pt. Says that she thinks her tooth was not removed. Pt. Says that she cannot check with a mirror, due to the dressings in her mouth. Pt. Says that she is going to call the emergency dental # at the [facility]. Now. COVID 19 Nurse Triage Plan/Patient Instructions    Please be aware that novel coronavirus (COVID-19) may be circulating in the community. If you develop symptoms such as fever, cough, or SOB or if you have concerns about the presence of another infection including coronavirus (COVID-19), please contact your health care provider or visit [email or web address].     Disposition/Instructions    Home care recommended. Follow home care protocol based instructions.    Thank you for taking steps to prevent the spread of this virus.  o Limit your contact with others.  o Wear a simple mask to cover your cough.  o Wash your hands well and often.    Resources    [facility]: About COVID-19: [email or web address]    CDC: What to Do If You're Sick: www.cdc.gov/coronavirus/2019-ncov/about/steps-when-sick.html    CDC: Ending Home Isolation: www.cdc.gov/coronavirus/2019-ncov/hcp/disposition-in-home-patients.html     CDC: Caring for Someone: www.cdc.gov/coronavirus/2019-ncov/if-you-are-sick/care-for-someone.html     [facility]: Interim Guidance for Hospital Discharge to Home: www.[email or web address].[email or web address]/diseases/coronavirus/hcp/hospdischarge.pdf    [facility] clinical trials (COVID-19 research studies): [email or web address].[email or web address]/umn-clinical-trials     Below are the COVID-19 hotlines at the [facility] of Health ([facility]). Interpreters are available.   o For health questions: Call 449-414-4780 or 1-418.477.7782 (7 a.m. to 7 p.m.)  o For questions about schools and childcare: Call 639-978-6317 or 1-840.401.1960 (7 a.m. to 7 p.m.)                       Additional Information    Negative: Sounds  like a life-threatening emergency to the triager    Negative: Tooth knocked out    Negative: [1] Bleeding present > 30 minutes AND [2] using correct technique of direct pressure    Negative: [1] Bleeding now AND [2] second call after being instructed in correct technique of direct pressure    Negative: [1] Has packing sutured over socket (extraction site) AND [2] now bleeding around the packing (Exception: few drops or ooze)    Negative: Tongue is very swollen and tender    Negative: [1] Face is swollen AND [2] fever    Negative: Patient sounds very sick or weak to the triager    Negative: [1] SEVERE pain (e.g., excruciating, unable to do any normal activities) AND [2] not improved 2 hours after pain medicine    Negative: Face is very swollen    Negative: Fever    Negative: [1] Caller has URGENT question AND [2] triager unable to answer question    Negative: [1] SEVERE pain (e.g., excruciating, pain scale 8-10) AND [2] begins or increases > 2 days (48 hours) after tooth was removed    Negative: [1] Foul taste or odor in mouth AND [2] begins or increases > 2 days (48 hours) after tooth was removed    Negative: [1] Bleeding off and on AND [2] persists > 1 day (24 hours) after tooth was removed    Negative: [1] Face is swollen AND [2] skin is red or tender to touch    Negative: [1] Face is swollen AND [2] begins or increases > 2 days (48 hours) after tooth was removed    Negative: [1] Caller has NON-URGENT question AND [2] triager unable to answer question    Negative: [1] Tooth extraction > 3 days ago AND [2] pain not improving    Tooth extraction, general questions about    Protocols used: TOOTH [id number]

## 2021-02-03 ENCOUNTER — HOSPITAL ENCOUNTER (EMERGENCY)
Facility: CLINIC | Age: 36
Discharge: HOME | End: 2021-02-03
Attending: EMERGENCY MEDICINE | Admitting: EMERGENCY MEDICINE
Payer: COMMERCIAL

## 2021-02-03 VITALS
WEIGHT: 211 LBS | SYSTOLIC BLOOD PRESSURE: 108 MMHG | DIASTOLIC BLOOD PRESSURE: 77 MMHG | HEART RATE: 74 BPM | BODY MASS INDEX: 31.16 KG/M2 | OXYGEN SATURATION: 98 % | TEMPERATURE: 98.8 F | RESPIRATION RATE: 16 BRPM

## 2021-02-03 DIAGNOSIS — G89.18 POSTOPERATIVE PAIN: ICD-10-CM

## 2021-02-03 PROCEDURE — 99284 EMERGENCY DEPT VISIT MOD MDM: CPT | Performed by: EMERGENCY MEDICINE

## 2021-02-03 PROCEDURE — 99283 EMERGENCY DEPT VISIT LOW MDM: CPT | Performed by: EMERGENCY MEDICINE

## 2021-02-03 PROCEDURE — 250N000013 HC RX MED GY IP 250 OP 250 PS 637: Performed by: EMERGENCY MEDICINE

## 2021-02-03 RX ORDER — IBUPROFEN 600 MG/1
600 TABLET, FILM COATED ORAL EVERY 6 HOURS PRN
COMMUNITY

## 2021-02-03 RX ORDER — OXYCODONE AND ACETAMINOPHEN 5; 325 MG/1; MG/1
1 TABLET ORAL EVERY 4 HOURS PRN
Qty: 12 TABLET | Refills: 0 | Status: SHIPPED | OUTPATIENT
Start: 2021-02-03

## 2021-02-03 RX ORDER — AMOXICILLIN 500 MG/1
500 TABLET, FILM COATED ORAL 2 TIMES DAILY
COMMUNITY

## 2021-02-03 RX ORDER — OXYCODONE HYDROCHLORIDE 5 MG/1
5 TABLET ORAL ONCE
Status: COMPLETED | OUTPATIENT
Start: 2021-02-03 | End: 2021-02-03

## 2021-02-03 RX ADMIN — OXYCODONE HYDROCHLORIDE 5 MG: 5 TABLET ORAL at 02:44

## 2021-02-03 ASSESSMENT — ENCOUNTER SYMPTOMS
FEVER: 0
SHORTNESS OF BREATH: 0
FACIAL SWELLING: 1
ABDOMINAL PAIN: 0

## 2021-02-03 NOTE — DISCHARGE INSTRUCTIONS
Please call your dentist for follow-up tomorrow.  Return to the emergency department as needed for any new or worsening symptoms.

## 2021-02-03 NOTE — ED TRIAGE NOTES
Pt had two teeth extracted this past week, one upper left, one lower left.  Pt unsure which is causing pain more the upper or lower.  Pt last took Ibuprofen 12 hours ago.  Pt is also on antibiotics from dentist.

## 2021-02-03 NOTE — ED PROVIDER NOTES
History     Chief Complaint   Patient presents with     Dental Pain     Pt had two teeth extracted this past week, one upper left, one lower left.  Pt unsure which is causing pain more the upper or lower.  Pt last took Ibuprofen 12 hours ago.     HPI  Miguel Madera is a 36 year old female who presents to us with a chief complaint of post tooth extraction pain.  Her posterior most lower left molar was removed 1 week ago.  She followed up 2 days ago and they removed some residual tooth.  She reports persistent pain since then.  She has been on antibiotics which she is taking as directed.  She denies any fevers.  No nausea or vomiting.  No difficulty swallowing.    I have reviewed the Medications, Allergies, Past Medical and Surgical History, and Social History in the [employer] system.  PAST MEDICAL HISTORY:   Past Medical History:   Diagnosis Date     NO ACTIVE PROBLEMS      Pneumonia        PAST SURGICAL HISTORY:   Past Surgical History:   Procedure Laterality Date     No Surgical History         Past medical history, past surgical history, medications, and allergies were reviewed with the patient. Additional pertinent items: None    FAMILY HISTORY: History reviewed. No pertinent family history.    SOCIAL HISTORY:   Social History     Tobacco Use     Smoking status: Never Smoker     Smokeless tobacco: Never Used   Substance Use Topics     Alcohol use: No     Social history was reviewed with the patient. Additional pertinent items: None        Patient's Medications   New Prescriptions    OXYCODONE-ACETAMINOPHEN (PERCOCET) 5-325 MG TABLET    Take 1 tablet by mouth every 4 hours as needed for pain   Previous Medications    ACETAMINOPHEN (TYLENOL) 500 MG TABLET    Take 1-2 tablets (500-1,000 mg) by mouth every 6 hours as needed for mild pain    AMOXICILLIN (AMOXIL) 500 MG TABLET    Take 500 mg by mouth 2 times daily    DOCUSATE SODIUM (COLACE) 100 MG TABLET    Take 100 mg by mouth 2 times daily as needed for constipation     IBUPROFEN (ADVIL/MOTRIN) 600 MG TABLET    Take 600 mg by mouth every 6 hours as needed for moderate pain    METHYLCELLULOSE (CITRUCEL) 500 MG TABS TABLET    Take 1 tablet (500 mg) by mouth daily    ONDANSETRON HCL (ZOFRAN PO)    Take 4 mg by mouth every 8 hours as needed for nausea or vomiting    OXYCODONE-ACETAMINOPHEN (PERCOCET) 5-325 MG PER TABLET    Take 1-2 tablets by mouth every 4 hours as needed for pain    POLYVINYL ALCOHOL (LIQUIFILM TEARS) 1.4 % OPHTHALMIC SOLUTION    Apply 1 drop to eye as needed for dry eyes Every hour as needed for itching in the eyes    PRENATAL VIT-FE FUMARATE-FA (PRENATAL MULTIVITAMIN  PLUS IRON) 27-0.8 MG TABS    Take 1 tablet by mouth daily   Modified Medications    No medications on file   Discontinued Medications    No medications on file        No Known Allergies     Review of Systems   Constitutional: Negative for fever.   HENT: Positive for facial swelling.    Respiratory: Negative for shortness of breath.    Cardiovascular: Negative for chest pain.   Gastrointestinal: Negative for abdominal pain.   All other systems reviewed and are negative.    Physical Exam   BP: 109/86  Pulse: 83  Temp: 98.8  F (37.1  C)  Resp: 16  Weight: 95.7 kg (211 lb)  SpO2: 100 %      Physical Exam  Vitals signs and nursing note reviewed.   Constitutional:       General: She is not in acute distress.     Appearance: She is well-developed.   HENT:      Head: Normocephalic.      Mouth/Throat:      Comments: Tooth extraction site lower left jaw area is tender to palpation.  There appears to be appropriate granulation tissue in the tooth bed.  No signs of surrounding abscess.  Eyes:      Extraocular Movements: Extraocular movements intact.   Neck:      Musculoskeletal: Neck supple.   Pulmonary:      Effort: No respiratory distress.   Abdominal:      General: There is no distension.   Musculoskeletal:         General: No deformity.   Skin:     General: Skin is dry.   Neurological:      Mental Status:  She is alert.      Comments: alert   Psychiatric:         Behavior: Behavior normal.         ED Course        Procedures          No results found for this or any previous visit (from the past 24 hour(s)).  Medications   oxyCODONE (ROXICODONE) tablet 5 mg (5 mg Oral Given 2/3/21 1791)             Assessments & Plan (with Medical Decision Making)   36-year-old female presents to us with a chief complaint of post extraction tooth pain.  There is no sign of new infection.  She is taking the antibiotic she was prescribed.  Recommended to  the patient she can follow-up with a dentist.  We will give her a short course of oxycodone.    I have reviewed the nursing notes.    I have reviewed the findings, diagnosis, plan and need for follow up with the patient.    New Prescriptions    OXYCODONE-ACETAMINOPHEN (PERCOCET) 5-325 MG TABLET    Take 1 tablet by mouth every 4 hours as needed for pain       Final diagnoses:   Postoperative pain       2/3/2021   [facility] EMERGENCY DEPARTMENT     Richard De Santiago DO  02/03/21 3070

## 2021-03-23 NOTE — PROGRESS NOTES
SUBJECTIVE:                                                   Bob Brambila is a 36 year old female who presents to clinic today for the following health issue(s):  Patient presents with:  Vaginal Bleeding: has not had period since december. Had some spotting last week.      HPI: The patient is seen at this time for follow-up.  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  She had normal cycles up until December.  She did not have a cycle for  and is just doing some spotting now.  She has 1 child.  She is not currently  but would like to consider fertility down the road.  She is mostly concerned of why she skipped 3 months.  She denies any possibility of pregnancy.      Patient's last menstrual period was 2020..     Patient is not sexually active, .  Using not sexually active for contraception.    reports that she has never smoked. She has never used smokeless tobacco.    STD testing offered?  Declined    Health maintenance updated:  yes    Today's PHQ-2 Score:   PHQ-2 (  [employer]) 3/25/2021   Q1: Little interest or pleasure in doing things 0   Q2: Feeling down, depressed or hopeless 0   PHQ-2 Score 0     Today's PHQ-9 Score:   PHQ-9 SCORE 3/20/2019   PHQ-9 Total Score 0     Today's ZACHARY-7 Score:   ZACHARY-7 SCORE 3/20/2019   Total Score 0       Problem list and histories reviewed & adjusted, as indicated.  Additional history: as documented.    There is no problem list on file for this patient.    Past Surgical History:   Procedure Laterality Date     ENT SURGERY      ear surgery     HYSTEROSCOPY  2019    Asherman's     HYSTEROSCOPY DIAGNOSTIC N/A 2019    Procedure: HYSTEROSCOPY, RESECTION OF INTRAUTERINE ADHESIONS;  Surgeon: Paulo Woodruff MD;  Location:  OR      Social History     Tobacco Use     Smoking status: Never Smoker     Smokeless tobacco: Never Used   Substance Use Topics     Alcohol use: No     Frequency: Never       "Problem (# of Occurrences) Relation (Name,Age of Onset)    No Known Problems (6) Mother, Father, Maternal Grandmother, Maternal Grandfather, Paternal Grandmother, Paternal Grandfather            Current Outpatient Medications   Medication Sig     Cholecalciferol (D3 VITAMIN PO)      estrogen conj (PREMARIN) 1.25 MG tablet Take 1 tablet (1.25 mg) by mouth daily for 21 days     naproxen (NAPROSYN) 500 MG tablet Take 500 mg by mouth 2 times daily     No current facility-administered medications for this visit.      No Known Allergies    ROS:  12 point review of systems negative other than symptoms noted below or in the HPI.  Genitourinary: Irregular Menses  No urinary frequency or dysuria, bladder or kidney problems      OBJECTIVE:     /72   Pulse 76   Ht 1.753 m (5' 9\")   Wt 99.8 kg (220 lb)   LMP 12/01/2020   BMI 32.49 kg/m    Body mass index is 32.49 kg/m .    Exam:  Constitutional:  Appearance: Well nourished, well developed alert, in no acute distress  Gastrointestinal:  Abdominal Examination:  Abdomen nontender to palpation, tone normal without rigidity or guarding, no masses present, umbilicus without lesions; Liver/Spleen:  No hepatomegaly present, liver nontender to palpation; Hernias:  No hernias present  Lymphatic: Lymph Nodes:  No other lymphadenopathy present  Skin: General Inspection:  No rashes present, no lesions present, no areas of discoloration.  Neurologic:  Mental Status:  Oriented X3.  Normal strength and tone, sensory exam grossly normal, mentation intact and speech normal.    Psychiatric:  Mentation appears normal and affect normal/bright.  Pelvic Exam:  External Genitalia:     Normal appearance for age, no discharge present, no tenderness present, no inflammatory lesions present, color normal  Vagina:     Normal vaginal vault without central or paravaginal defects, no discharge present, no inflammatory lesions present, no masses present  Bladder:     Nontender to " palpation  Urethra:   Urethral Body:  Urethra palpation normal, urethra structural support normal   Urethral Meatus:  No erythema or lesions present  Cervix:     Appearance healthy, no lesions present, nontender to palpation, no bleeding present  Uterus:     Uterus: firm, normal sized and nontender, midplane in position.   Adnexa:     No adnexal tenderness present, no adnexal masses present  Perineum:     Perineum within normal limits, no evidence of trauma, no rashes or skin lesions present  Anus:     Anus within normal limits, no hemorrhoids present  Inguinal Lymph Nodes:     No lymphadenopathy present  Pubic Hair:     Normal pubic hair distribution for age  Genitalia and Groin:     No rashes present, no lesions present, no areas of discoloration, no masses present       In-Clinic Test Results:      ASSESSMENT/PLAN:                                                        ICD-10-CM    1. Screening for cervical cancer  Z12.4      Patient is 36 years old with a past history of Asherman syndrome after her lysis of intrauterine adhesions that accessed her right tubal ostia but not the left she had fairly regular cycles.  She skipped January February and most of March until she is spotting at this time.  This is probably more likely anovulation than it is return of her Asherman's as she was having fairly normal cycles.  We will treat her with a cycle of Provera to see if we can get a good shed off.      Paulo Woodruff MD  [facility] [place]

## 2021-03-25 ENCOUNTER — APPOINTMENT (OUTPATIENT)
Dept: INTERPRETER SERVICES | Facility: CLINIC | Age: 36
End: 2021-03-25
Payer: COMMERCIAL

## 2021-03-25 ENCOUNTER — OFFICE VISIT (OUTPATIENT)
Dept: OBGYN | Facility: CLINIC | Age: 36
End: 2021-03-25
Payer: COMMERCIAL

## 2021-03-25 VITALS
WEIGHT: 220 LBS | SYSTOLIC BLOOD PRESSURE: 112 MMHG | HEIGHT: 69 IN | HEART RATE: 76 BPM | DIASTOLIC BLOOD PRESSURE: 72 MMHG | BODY MASS INDEX: 32.58 KG/M2

## 2021-03-25 DIAGNOSIS — N93.8 DUB (DYSFUNCTIONAL UTERINE BLEEDING): ICD-10-CM

## 2021-03-25 DIAGNOSIS — Z12.4 SCREENING FOR CERVICAL CANCER: Primary | ICD-10-CM

## 2021-03-25 PROCEDURE — 99213 OFFICE O/P EST LOW 20 MIN: CPT | Performed by: OBSTETRICS & GYNECOLOGY

## 2021-03-25 RX ORDER — MEDROXYPROGESTERONE ACETATE 10 MG
10 TABLET ORAL DAILY
Qty: 7 TABLET | Refills: 1 | Status: SHIPPED | OUTPATIENT
Start: 2021-03-25 | End: 2021-04-01

## 2021-03-25 RX ORDER — NAPROXEN 500 MG/1
500 TABLET ORAL 2 TIMES DAILY
COMMUNITY
Start: 2021-03-11

## 2021-03-25 ASSESSMENT — MIFFLIN-ST. JEOR: SCORE: 1742.29

## 2021-03-25 NOTE — Clinical Note
Please abstract the following data from this visit with this patient into the appropriate field in Epic:    T.     Other Tests found in the patient's chart through Chart Review/Care Everywhere:    Pap smear done by this group [facility] on this date: 1/28/21    Note to [employer]: If this section is blank, no results were found via Chart Review/Care Everywhere.

## 2021-07-14 NOTE — PROGRESS NOTES
SUBJECTIVE:                                                   Bob Brambila is a 36 year old female who presents to clinic today for the following health issue(s):  Patient presents with:  RECHECK: here to follow up, did do the provera with no period. Patient wants to know if her fallopian tubes are open.      HPI: The patient is a 36-year-old who has a past history of intrauterine adhesions and a hysteroscopy with lysis of adhesions inside the uterus and 2019.  She had a few regular cycles in  and had a cycle in January and February.  She only did some light spotting in March.  She has not had any bleeding since that time.  She denies any fever or chills.  She denies any hot flashes.      No LMP recorded. (Menstrual status: Amenorrhea)..     Patient is not sexually active, .  Using none for contraception.    reports that she has never smoked. She has never used smokeless tobacco.    STD testing offered?  Accepted    Health maintenance updated:  yes    Today's PHQ-2 Score:   PHQ-2 (  [employer]) 3/25/2021   Q1: Little interest or pleasure in doing things 0   Q2: Feeling down, depressed or hopeless 0   PHQ-2 Score 0     Today's PHQ-9 Score:   PHQ-9 SCORE 3/20/2019   PHQ-9 Total Score 0     Today's ZACHARY-7 Score:   ZACHARY-7 SCORE 3/20/2019   Total Score 0       Problem list and histories reviewed & adjusted, as indicated.  Additional history: as documented.    There is no problem list on file for this patient.    Past Surgical History:   Procedure Laterality Date     ENT SURGERY      ear surgery     HYSTEROSCOPY  2019    Asherman's     HYSTEROSCOPY DIAGNOSTIC N/A 2019    Procedure: HYSTEROSCOPY, RESECTION OF INTRAUTERINE ADHESIONS;  Surgeon: Paulo Woodruff MD;  Location:  OR      Social History     Tobacco Use     Smoking status: Never Smoker     Smokeless tobacco: Never Used   Substance Use Topics     Alcohol use: No      Problem (# of Occurrences) Relation (Name,Age of Onset)    No Known  "Problems (6) Mother, Father, Maternal Grandmother, Maternal Grandfather, Paternal Grandmother, Paternal Grandfather            Current Outpatient Medications   Medication Sig     Cholecalciferol (D3 VITAMIN PO)      naproxen (NAPROSYN) 500 MG tablet Take 500 mg by mouth 2 times daily     Prenatal 27-1 MG TABS Take 1 tablet by mouth daily     No current facility-administered medications for this visit.     No Known Allergies    ROS:  12 point review of systems negative other than symptoms noted below or in the HPI.  No urinary frequency or dysuria, bladder or kidney problems      OBJECTIVE:     /76   Pulse 68   Ht 1.753 m (5' 9\")   Wt 97.5 kg (215 lb)   BMI 31.75 kg/m    Body mass index is 31.75 kg/m .    Exam:  Constitutional:  Appearance: Well nourished, well developed alert, in no acute distress  Gastrointestinal:  Abdominal Examination:  Abdomen nontender to palpation, tone normal without rigidity or guarding, no masses present, umbilicus without lesions; Liver/Spleen:  No hepatomegaly present, liver nontender to palpation; Hernias:  No hernias present  Lymphatic: Lymph Nodes:  No other lymphadenopathy present  Skin: General Inspection:  No rashes present, no lesions present, no areas of discoloration.  Neurologic:  Mental Status:  Oriented X3.  Normal strength and tone, sensory exam grossly normal, mentation intact and speech normal.    Psychiatric:  Mentation appears normal and affect normal/bright.  Pelvic Exam:  External Genitalia:     Normal appearance for age, no discharge present, no tenderness present, no inflammatory lesions present, color normal  Vagina:     Normal vaginal vault without central or paravaginal defects, no discharge present, no inflammatory lesions present, no masses present  Bladder:     Nontender to palpation  Urethra:   Urethral Body:  Urethra palpation normal, urethra structural support normal   Urethral Meatus:  No erythema or lesions present  Cervix:     Appearance " healthy, no lesions present, nontender to palpation, no bleeding present  Uterus:     Uterus: firm, normal sized and nontender, midplane in position.   Adnexa:     No adnexal tenderness present, no adnexal masses present  Perineum:     Perineum within normal limits, no evidence of trauma, no rashes or skin lesions present  Anus:     Anus within normal limits, no hemorrhoids present  Inguinal Lymph Nodes:     No lymphadenopathy present  Pubic Hair:     Normal pubic hair distribution for age  Genitalia and Groin:     No rashes present, no lesions present, no areas of discoloration, no masses present       In-Clinic Test Results:      ASSESSMENT/PLAN:                                                        ICD-10-CM    1. Screen for STD (sexually transmitted disease)  Z11.3 NEISSERIA GONORRHOEA PCR     Treponema Abs w Reflex to RPR and Titer   2. Screening for chlamydial disease  Z11.8 CHLAMYDIA TRACHOMATIS PCR   3. Screening for HIV (human immunodeficiency virus)  Z11.4 HIV Antigen Antibody Combo   4. Amenorrhea  N91.2      36-year-old patient with past history of Asherman syndrome and has history of very irregular cycles.  She has numerous questions about her endometrial cavity and patency to her tubes.  We will follow-up her cultures at this time.  She is presented the option of a hysterosalpingogram to look at her cavity and see if her tubes are patent.  We will try to induce a period with Provera at this time.  She will return in 3 weeks to review if she bled at all and schedule the hysterosalpingogram.          Paulo Woodruff MD  [facility] [place]

## 2021-07-15 ENCOUNTER — OFFICE VISIT (OUTPATIENT)
Dept: OBGYN | Facility: CLINIC | Age: 36
End: 2021-07-15
Payer: COMMERCIAL

## 2021-07-15 VITALS
DIASTOLIC BLOOD PRESSURE: 76 MMHG | HEART RATE: 68 BPM | BODY MASS INDEX: 31.84 KG/M2 | WEIGHT: 215 LBS | SYSTOLIC BLOOD PRESSURE: 112 MMHG | HEIGHT: 69 IN

## 2021-07-15 DIAGNOSIS — Z11.3 SCREEN FOR STD (SEXUALLY TRANSMITTED DISEASE): Primary | ICD-10-CM

## 2021-07-15 DIAGNOSIS — N91.2 AMENORRHEA: ICD-10-CM

## 2021-07-15 DIAGNOSIS — Z11.8 SCREENING FOR CHLAMYDIAL DISEASE: ICD-10-CM

## 2021-07-15 DIAGNOSIS — Z11.4 SCREENING FOR HIV (HUMAN IMMUNODEFICIENCY VIRUS): ICD-10-CM

## 2021-07-15 PROCEDURE — 87591 N.GONORRHOEAE DNA AMP PROB: CPT | Performed by: OBSTETRICS & GYNECOLOGY

## 2021-07-15 PROCEDURE — 36415 COLL VENOUS BLD VENIPUNCTURE: CPT | Performed by: OBSTETRICS & GYNECOLOGY

## 2021-07-15 PROCEDURE — 86780 TREPONEMA PALLIDUM: CPT | Performed by: OBSTETRICS & GYNECOLOGY

## 2021-07-15 PROCEDURE — 99213 OFFICE O/P EST LOW 20 MIN: CPT | Performed by: OBSTETRICS & GYNECOLOGY

## 2021-07-15 PROCEDURE — 87491 CHLMYD TRACH DNA AMP PROBE: CPT | Performed by: OBSTETRICS & GYNECOLOGY

## 2021-07-15 PROCEDURE — 87389 HIV-1 AG W/HIV-1&-2 AB AG IA: CPT | Performed by: OBSTETRICS & GYNECOLOGY

## 2021-07-15 RX ORDER — PRENATAL WITH FERROUS FUM AND FOLIC ACID 3080; 920; 120; 400; 22; 1.84; 3; 20; 10; 1; 12; 200; 27; 25; 2 [IU]/1; [IU]/1; MG/1; [IU]/1; MG/1; MG/1; MG/1; MG/1; MG/1; MG/1; UG/1; MG/1; MG/1; MG/1; MG/1
1 TABLET ORAL DAILY
COMMUNITY
Start: 2021-06-25

## 2021-07-15 RX ORDER — MEDROXYPROGESTERONE ACETATE 10 MG
10 TABLET ORAL DAILY
Qty: 10 TABLET | Refills: 0 | Status: SHIPPED | OUTPATIENT
Start: 2021-07-15 | End: 2021-07-25

## 2021-07-15 ASSESSMENT — MIFFLIN-ST. JEOR: SCORE: 1719.61

## 2021-07-16 LAB
C TRACH DNA SPEC QL NAA+PROBE: NEGATIVE
HIV 1+2 AB+HIV1 P24 AG SERPL QL IA: NONREACTIVE
N GONORRHOEA DNA SPEC QL NAA+PROBE: NEGATIVE
T PALLIDUM AB SER QL: NONREACTIVE

## 2021-08-03 ENCOUNTER — TELEPHONE (OUTPATIENT)
Dept: OBGYN | Facility: CLINIC | Age: 36
End: 2021-08-03

## 2021-08-03 ENCOUNTER — APPOINTMENT (OUTPATIENT)
Dept: INTERPRETER SERVICES | Facility: CLINIC | Age: 36
End: 2021-08-03
Payer: COMMERCIAL

## 2021-08-03 NOTE — TELEPHONE ENCOUNTER
Pt advised to keep apt on Thursday.  Pt verbalized understanding, in agreement with plan, and voiced no further questions.  Daniella Acosta RN on 8/3/2021 at 1:33 PM

## 2021-08-03 NOTE — TELEPHONE ENCOUNTER
Pt calling with [place] citizen     Pt reports she took the Provera and has not started bleeding.   Is asking if she should keep her appointment on Thrusday 8/5/21.    OV 7/15/21:  She is presented the option of a hysterosalpingogram to look at her cavity and see if her tubes are patent.  We will try to induce a period with Provera at this time.  She will return in 3 weeks to review if she bled at all and schedule the hysterosalpingogram.    Keep appt Thursday?    Routing to provider for review/recommendations.    Dede Whaley RN on 8/3/2021 at 12:24 PM

## 2021-08-04 NOTE — PROGRESS NOTES
SUBJECTIVE:                                                   Bob Brambila is a 36 year old female who presents to clinic today for the following health issue(s):  Patient presents with:  Follow Up: here to follow up after provera trial. No period, but feels she had period symptoms.      HPI: 36-year-old patient with past history of Asherman syndrome and amenorrhea.  The patient had a few cycles in 2020 in early .  We gave her a challenge of Provera but it appears that she had no bleeding but just some cramping.  There certainly is a question of outflow scarring that may be in play.      No LMP recorded. (Menstrual status: Amenorrhea)..     Patient is not sexually active, .  Using not sexually active for contraception.    reports that she has never smoked. She has never used smokeless tobacco.    STD testing offered?  Declined    Health maintenance updated:  yes    Today's PHQ-2 Score:   PHQ-2 (  [employer]) 2021   Q1: Little interest or pleasure in doing things 0   Q2: Feeling down, depressed or hopeless 0   PHQ-2 Score 0     Today's PHQ-9 Score:   PHQ-9 SCORE 3/20/2019   PHQ-9 Total Score 0     Today's ZACHARY-7 Score:   ZACHARY-7 SCORE 3/20/2019   Total Score 0       Problem list and histories reviewed & adjusted, as indicated.  Additional history: as documented.    There is no problem list on file for this patient.    Past Surgical History:   Procedure Laterality Date     ENT SURGERY      ear surgery     HYSTEROSCOPY  2019    Asherman's     HYSTEROSCOPY DIAGNOSTIC N/A 2019    Procedure: HYSTEROSCOPY, RESECTION OF INTRAUTERINE ADHESIONS;  Surgeon: Paulo Woodruff MD;  Location:  OR      Social History     Tobacco Use     Smoking status: Never Smoker     Smokeless tobacco: Never Used   Substance Use Topics     Alcohol use: No      Problem (# of Occurrences) Relation (Name,Age of Onset)    No Known Problems (6) Mother, Father, Maternal Grandmother, Maternal Grandfather, Paternal Grandmother,  "Paternal Grandfather            Current Outpatient Medications   Medication Sig     Cholecalciferol (D3 VITAMIN PO)      naproxen (NAPROSYN) 500 MG tablet Take 500 mg by mouth 2 times daily     Prenatal 27-1 MG TABS Take 1 tablet by mouth daily     No current facility-administered medications for this visit.     No Known Allergies    ROS:  12 point review of systems negative other than symptoms noted below or in the HPI.  Constitutional: Fatigue  No urinary frequency or dysuria, bladder or kidney problems      OBJECTIVE:     /72   Pulse 76   Ht 1.753 m (5' 9\")   Wt 99.8 kg (220 lb)   BMI 32.49 kg/m    Body mass index is 32.49 kg/m .    Exam:  Constitutional:  Appearance: Well nourished, well developed alert, in no acute distress  Neurologic:  Mental Status:  Oriented X3.  Normal strength and tone, sensory exam grossly normal, mentation intact and speech normal.    Psychiatric:  Mentation appears normal and affect normal/bright.     In-Clinic Test Results:      ASSESSMENT/PLAN:                                                        ICD-10-CM    1. Amenorrhea  N91.2      The patient did not have any vaginal bleeding but had all the symptoms of a.  All the way to the lumina.  I am questioning whether or not she has an outflow issue and we will order an ultrasound to see if there is a possible cervical obstruction.  If we see fluid in the endometrial cavity she may need to have a dilatation.  The patient understood this through her .        Paulo Woodruff MD  [facility] [place]  "

## 2021-08-05 ENCOUNTER — OFFICE VISIT (OUTPATIENT)
Dept: OBGYN | Facility: CLINIC | Age: 36
End: 2021-08-05
Payer: COMMERCIAL

## 2021-08-05 ENCOUNTER — APPOINTMENT (OUTPATIENT)
Dept: INTERPRETER SERVICES | Facility: CLINIC | Age: 36
End: 2021-08-05
Payer: COMMERCIAL

## 2021-08-05 VITALS
DIASTOLIC BLOOD PRESSURE: 72 MMHG | HEIGHT: 69 IN | BODY MASS INDEX: 32.58 KG/M2 | HEART RATE: 76 BPM | SYSTOLIC BLOOD PRESSURE: 112 MMHG | WEIGHT: 220 LBS

## 2021-08-05 DIAGNOSIS — N91.2 AMENORRHEA: Primary | ICD-10-CM

## 2021-08-05 PROCEDURE — 99213 OFFICE O/P EST LOW 20 MIN: CPT | Performed by: OBSTETRICS & GYNECOLOGY

## 2021-08-05 ASSESSMENT — MIFFLIN-ST. JEOR: SCORE: 1742.29

## 2021-08-09 NOTE — PROGRESS NOTES
SUBJECTIVE:                                                   Bob Brambila is a 36 year old female who presents to clinic today for the following health issue(s):  Patient presents with:  Ultrasound: f/u to amenorrhea- pt still hasn't gotten period      HPI: Patient with past history of Asherman syndrome.  She is here for ultrasound follow-up today.      No LMP recorded (lmp unknown). (Menstrual status: Amenorrhea)..     Patient is not sexually active, .  Using not sexually active for contraception.    reports that she has never smoked. She has never used smokeless tobacco.    STD testing offered?  Declined    Health maintenance updated:  yes    Today's PHQ-2 Score:   PHQ-2 (  [employer]) 2021   Q1: Little interest or pleasure in doing things 0   Q2: Feeling down, depressed or hopeless 0   PHQ-2 Score 0     Today's PHQ-9 Score:   PHQ-9 SCORE 3/20/2019   PHQ-9 Total Score 0     Today's ZACHARY-7 Score:   ZACHARY-7 SCORE 3/20/2019   Total Score 0       Problem list and histories reviewed & adjusted, as indicated.  Additional history: as documented.    There is no problem list on file for this patient.    Past Surgical History:   Procedure Laterality Date     ENT SURGERY      ear surgery     HYSTEROSCOPY  2019    Asherman's     HYSTEROSCOPY DIAGNOSTIC N/A 2019    Procedure: HYSTEROSCOPY, RESECTION OF INTRAUTERINE ADHESIONS;  Surgeon: Paulo Woodruff MD;  Location:  OR      Social History     Tobacco Use     Smoking status: Never Smoker     Smokeless tobacco: Never Used   Substance Use Topics     Alcohol use: No      Problem (# of Occurrences) Relation (Name,Age of Onset)    No Known Problems (9) Mother, Father, Sister, Brother, Maternal Grandmother, Maternal Grandfather, Paternal Grandmother, Paternal Grandfather, Other            Current Outpatient Medications   Medication Sig     Cholecalciferol (D3 VITAMIN PO)      naproxen (NAPROSYN) 500 MG tablet Take 500 mg by mouth 2 times daily     Prenatal  "27-1 MG TABS Take 1 tablet by mouth daily     No current facility-administered medications for this visit.     No Known Allergies    ROS:  12 point review of systems negative other than symptoms noted below or in the HPI.  Genitourinary: No Periods  No urinary frequency or dysuria, bladder or kidney problems      OBJECTIVE:     /76   Pulse 76   Ht 1.753 m (5' 9\")   Wt 99.8 kg (220 lb)   LMP  (LMP Unknown)   BMI 32.49 kg/m    Body mass index is 32.49 kg/m .    Exam:  Constitutional:  Appearance: Well nourished, well developed alert, in no acute distress  Neurologic:  Mental Status:  Oriented X3.  Normal strength and tone, sensory exam grossly normal, mentation intact and speech normal.    Psychiatric:  Mentation appears normal and affect normal/bright.     In-Clinic Test Results:  Results for orders placed or performed in visit on 08/24/21   US Transvaginal Non OB     Status: None    Narrative    US Transvaginal Non OB  Order #: [id number] Accession #: [id number]  Study Notes     Nallely Mcgee on 8/24/2021  3:08 PM      Gynecological Ultrasound Report  Pelvic U/S - Transvaginal  [facility]  Referring Provider: Daniella Avitia CNP  Sonographer:  Nallely Mcgee RDMS  Indication: Amenorrhea  LMP: No LMP recorded. (Menstrual status: Amenorrhea).  History:   Gynecological Ultrasonography:   Uterus: retroflexed. Contour is irregular w/ myomata: 1 Right Subserosal   Pedunculated 1.6 x 1.3 x 1.3 cm.  Size: 6.3 x 3.9 x 3.5 cm  Endometrium: Thickness Total 3.6 mm  Findings: Small amount of fluid in endometrium  Right Ovary: 2.3 x 1.5 x 1.2 cm. Wnl  Left Ovary: 4.3 x 1.6 x 1.6 cm. Mostly simple cysts  1.6 x 0.6 x 1.8 cm &   1.9 x 1.5 x 1.8 cm  Cul de Sac Free Fluid: No free fluid     Impression: Pedunculated subserosal myoma, thin endometrium, simple cyst   left ovary       Paulo BOWEN        Discussed in     Results for orders placed or performed in visit on 08/24/21 (from the past 24 " hour(s))   US Transvaginal Non OB    Narrative    US Transvaginal Non OB  Order #: [id number] Accession #: [id number]  Study Notes     Nallely Mcgee on 8/24/2021  3:08 PM      Gynecological Ultrasound Report  Pelvic U/S - Transvaginal  [facility]  Referring Provider: Daniella Avitia CNP  Sonographer:  Nallely Mcgee RDMS  Indication: Amenorrhea  LMP: No LMP recorded. (Menstrual status: Amenorrhea).  History:   Gynecological Ultrasonography:   Uterus: retroflexed. Contour is irregular w/ myomata: 1 Right Subserosal   Pedunculated 1.6 x 1.3 x 1.3 cm.  Size: 6.3 x 3.9 x 3.5 cm  Endometrium: Thickness Total 3.6 mm  Findings: Small amount of fluid in endometrium  Right Ovary: 2.3 x 1.5 x 1.2 cm. Wnl  Left Ovary: 4.3 x 1.6 x 1.6 cm. Mostly simple cysts  1.6 x 0.6 x 1.8 cm &   1.9 x 1.5 x 1.8 cm  Cul de Sac Free Fluid: No free fluid     Impression: Pedunculated subserosal myoma, thin endometrium, simple cyst   left ovary       Paulo BOWEN        Discussed in       ASSESSMENT/PLAN:                                                        ICD-10-CM    1. Asherman syndrome  N85.6    2. Amenorrhea  N91.2        36-year-old patient with past history of Asherman's who has a thin lining at 3.7 mm and a small amount of fluid.  I am concerned about possible outflow obstruction as she has a past history of Asherman's.  We have given her the option of having a cervical dilatation at this point or observing for 2-3 more months to see if she spontaneously bleeds.  We have explained that with prepping Betadine and Novocain to the cervix we should be able to dilate the lower uterine segment here in the office.        Paulo Woodruff MD  [facility] [place]

## 2021-08-24 ENCOUNTER — ANCILLARY PROCEDURE (OUTPATIENT)
Dept: ULTRASOUND IMAGING | Facility: CLINIC | Age: 36
End: 2021-08-24
Attending: OBSTETRICS & GYNECOLOGY
Payer: COMMERCIAL

## 2021-08-24 ENCOUNTER — OFFICE VISIT (OUTPATIENT)
Dept: OBGYN | Facility: CLINIC | Age: 36
End: 2021-08-24
Attending: OBSTETRICS & GYNECOLOGY
Payer: COMMERCIAL

## 2021-08-24 VITALS
HEART RATE: 76 BPM | BODY MASS INDEX: 32.58 KG/M2 | DIASTOLIC BLOOD PRESSURE: 76 MMHG | HEIGHT: 69 IN | WEIGHT: 220 LBS | SYSTOLIC BLOOD PRESSURE: 118 MMHG

## 2021-08-24 DIAGNOSIS — N91.2 AMENORRHEA: ICD-10-CM

## 2021-08-24 DIAGNOSIS — N85.6 ASHERMAN SYNDROME: Primary | ICD-10-CM

## 2021-08-24 PROCEDURE — 99213 OFFICE O/P EST LOW 20 MIN: CPT | Performed by: OBSTETRICS & GYNECOLOGY

## 2021-08-24 PROCEDURE — 76830 TRANSVAGINAL US NON-OB: CPT | Performed by: OBSTETRICS & GYNECOLOGY

## 2021-08-24 ASSESSMENT — MIFFLIN-ST. JEOR: SCORE: 1742.29

## 2022-01-11 ENCOUNTER — HOSPITAL ENCOUNTER (EMERGENCY)
Facility: CLINIC | Age: 37
Discharge: HOME | End: 2022-01-11
Attending: EMERGENCY MEDICINE | Admitting: EMERGENCY MEDICINE
Payer: COMMERCIAL

## 2022-01-11 VITALS
SYSTOLIC BLOOD PRESSURE: 129 MMHG | RESPIRATION RATE: 20 BRPM | OXYGEN SATURATION: 99 % | HEART RATE: 86 BPM | DIASTOLIC BLOOD PRESSURE: 82 MMHG | WEIGHT: 223.6 LBS | TEMPERATURE: 97.8 F | BODY MASS INDEX: 33.02 KG/M2

## 2022-01-11 DIAGNOSIS — H60.391 INFECTIVE OTITIS EXTERNA, RIGHT: Primary | ICD-10-CM

## 2022-01-11 PROCEDURE — 250N000013 HC RX MED GY IP 250 OP 250 PS 637: Performed by: EMERGENCY MEDICINE

## 2022-01-11 PROCEDURE — 99283 EMERGENCY DEPT VISIT LOW MDM: CPT | Performed by: EMERGENCY MEDICINE

## 2022-01-11 RX ORDER — ACETAMINOPHEN 325 MG/1
975 TABLET ORAL ONCE
Status: COMPLETED | OUTPATIENT
Start: 2022-01-11 | End: 2022-01-11

## 2022-01-11 RX ORDER — IBUPROFEN 600 MG/1
600 TABLET, FILM COATED ORAL ONCE
Status: COMPLETED | OUTPATIENT
Start: 2022-01-11 | End: 2022-01-11

## 2022-01-11 RX ORDER — OFLOXACIN 3 MG/ML
5 SOLUTION AURICULAR (OTIC) 2 TIMES DAILY
Qty: 5 ML | Refills: 0 | Status: SHIPPED | OUTPATIENT
Start: 2022-01-11 | End: 2022-01-18

## 2022-01-11 RX ADMIN — ACETAMINOPHEN 975 MG: 325 TABLET, FILM COATED ORAL at 15:12

## 2022-01-11 RX ADMIN — IBUPROFEN 600 MG: 600 TABLET ORAL at 15:13

## 2022-01-11 ASSESSMENT — ENCOUNTER SYMPTOMS
NECK PAIN: 0
SHORTNESS OF BREATH: 0
BACK PAIN: 0
ABDOMINAL PAIN: 0
HEADACHES: 0
DIFFICULTY URINATING: 0
NAUSEA: 0
NERVOUS/ANXIOUS: 0
FEVER: 0
CHILLS: 0
SINUS PAIN: 0
TROUBLE SWALLOWING: 0
VOMITING: 0
RHINORRHEA: 0
SINUS PRESSURE: 0

## 2022-01-11 NOTE — DISCHARGE INSTRUCTIONS
You have an infection of your outer ear called otitis externa.  I have prescribed antibiotic drops.  Take Tylenol/ibuprofen for pain.  Make sure to see your doctor for follow-up in 1 week.  I have also placed an ENT follow-up if you are unable to arrange other follow-up appointment with your doctor

## 2022-01-11 NOTE — ED TRIAGE NOTES
Right side ear pain for 3 days with clear/sticky discharge.  Pt states pain is minimal currently but last evening it was 8/10.  Took tylenol with good relief. No OTC meds today

## 2022-01-11 NOTE — ED PROVIDER NOTES
"    [facility] EMERGENCY DEPARTMENT ([facility])    1/11/22     ED 2 5:12 PM   History     Chief Complaint   Patient presents with     Otalgia     right     The history is provided by the patient and medical records.     Miguel Madera is a 37 year old female who presents with right ear drainage. She noticed right ear itching for 2 months. On Friday she went out in the cold and noticed that the \"water broke\" in her right ear   Last night she was unable to sleep due to ear pain and so presents for evaluation. No fevers, chills, rhinorrhea, chest pain, shortness of breath, or difficulty swallowing. She notes similar ear pain when she was a child. No hearing loss or tinnitus. While she was waiting in the ER she noticed left ear itching.  She denies ever putting anything in her ears. She notes having COVID-19 2 months ago. She has a primary care doctor but doesn't really see them.     I have reviewed the Medications, Allergies, Past Medical and Surgical History, and Social History in the [employer] system.    PAST MEDICAL HISTORY:   Past Medical History:   Diagnosis Date     NO ACTIVE PROBLEMS      Pneumonia        PAST SURGICAL HISTORY:   Past Surgical History:   Procedure Laterality Date     No Surgical History         Past medical history, past surgical history, medications, and allergies were reviewed with the patient. Additional pertinent items: None    FAMILY HISTORY: History reviewed. No pertinent family history.    SOCIAL HISTORY:   Social History     Tobacco Use     Smoking status: Never Smoker     Smokeless tobacco: Never Used   Substance Use Topics     Alcohol use: No       Discharge Medication List as of 1/11/2022  5:31 PM      START taking these medications    Details   ofloxacin (FLOXIN) 0.3 % otic solution Place 5 drops in ear(s) 2 times daily for 7 days, Disp-5 mL, R-0, Local Print         CONTINUE these medications which have NOT CHANGED    Details   acetaminophen (TYLENOL) 500 MG tablet Take 1-2 tablets " (500-1,000 mg) by mouth every 6 hours as needed for mild pain, Disp-30 tablet, R-0, Normal      amoxicillin (AMOXIL) 500 MG tablet Take 500 mg by mouth 2 times daily, Historical      docusate sodium (COLACE) 100 MG tablet Take 100 mg by mouth 2 times daily as needed for constipation, Disp-60 tablet, R-0, Local Print      ibuprofen (ADVIL/MOTRIN) 600 MG tablet Take 600 mg by mouth every 6 hours as needed for moderate pain, Historical      methylcellulose (CITRUCEL) 500 MG TABS tablet Take 1 tablet (500 mg) by mouth daily, Disp-14 each, R-0, Local Print      Ondansetron HCl (ZOFRAN PO) Take 4 mg by mouth every 8 hours as needed for nausea or vomiting, Historical      !! oxyCODONE-acetaminophen (PERCOCET) 5-325 MG per tablet Take 1-2 tablets by mouth every 4 hours as needed for pain, Disp-10 tablet, R-0, Local Print      !! oxyCODONE-acetaminophen (PERCOCET) 5-325 MG tablet Take 1 tablet by mouth every 4 hours as needed for pain, Disp-12 tablet, R-0, Local Print      Prenatal Vit-Fe Fumarate-FA (PRENATAL MULTIVITAMIN  PLUS IRON) 27-0.8 MG TABS Take 1 tablet by mouth daily, Disp-60 tablet, R-0, Normal      polyvinyl alcohol (LIQUIFILM TEARS) 1.4 % ophthalmic solution Apply 1 drop to eye as needed for dry eyes Every hour as needed for itching in the eyes, Disp-15 mL, R-0, Normal       !! - Potential duplicate medications found. Please discuss with provider.           No Known Allergies     Social history was reviewed with the patient. Additional pertinent items: None        Review of Systems   Constitutional: Negative for chills and fever.   HENT: Positive for ear discharge and ear pain. Negative for postnasal drip, rhinorrhea, sinus pressure, sinus pain, tinnitus and trouble swallowing.    Respiratory: Negative for shortness of breath.    Cardiovascular: Negative for chest pain.   Gastrointestinal: Negative for abdominal pain, nausea and vomiting.   Genitourinary: Negative for difficulty urinating.   Musculoskeletal:  Negative for back pain and neck pain.   Neurological: Negative for headaches.   Psychiatric/Behavioral: The patient is not nervous/anxious.      A complete review of systems was performed with pertinent positives and negatives noted in the HPI, and all other systems negative.  Physical Exam   BP: 129/82  Pulse: 86  Temp: 97.8  F (36.6  C)  Resp: 12  Weight: 101.4 kg (223 lb 9.6 oz)  SpO2: 99 %      Physical Exam  Vitals and nursing note reviewed.   Constitutional:       General: She is not in acute distress.     Appearance: Normal appearance.   HENT:      Head: Normocephalic.      Ears:      Comments: Exam of eft ear examination is unremarkable.  Right-sided external auditory canal with considerable inflammation/erythema and mild serous drainage, consistent with otitis externa.  No evidence of malignant process.  Only able to visualize superior corner of tympanic membrane due to significant inflammation of external canal.  Does not appear obviously erythematous or bulging.  Less likely otitis media.     Nose: Nose normal.   Eyes:      Pupils: Pupils are equal, round, and reactive to light.   Cardiovascular:      Rate and Rhythm: Normal rate and regular rhythm.   Pulmonary:      Effort: Pulmonary effort is normal.   Abdominal:      General: There is no distension.   Musculoskeletal:         General: No deformity. Normal range of motion.      Cervical back: Normal range of motion.   Lymphadenopathy:      Cervical: No cervical adenopathy.   Skin:     General: Skin is warm.   Neurological:      Mental Status: She is alert and oriented to person, place, and time.   Psychiatric:         Mood and Affect: Mood normal.         ED Course   No results found for this or any previous visit (from the past 24 hour(s)).  Medications   acetaminophen (TYLENOL) tablet 975 mg (975 mg Oral Given 1/11/22 1512)   ibuprofen (ADVIL/MOTRIN) tablet 600 mg (600 mg Oral Given 1/11/22 1513)         The medical record was reviewed and  interpreted.  Managed outpatient prescription medications.            Assessments & Plan (with Medical Decision Making)   Patient presents to the ED for evaluation of right ear pain.    Differential diagnosis includes Bharath media, otitis externa, foreign body, Ménière's disease, labrynthitis    On arrival, patient has normal vital signs.  She overall appears well and nontoxic.  TM is unremarkable.  Examination of the right ear shows significant erythema/inflammation of the external auditory canal.  Right TM is difficult to visualize secondary to canal inflammation, but the most superior border is nonerythematous and nonbulging.  Less likely otitis media.  Exam consistent with likely infective otitis externa.  Patient without any significant risk factors and is nontoxic-appearing, low suspicion for malignant otitis externa.  Patient will be started on ofloxacin otic drops.  Patient provided with prescription.  She is to follow-up with her primary care physician.  She states she has not seen her primary care doctor in a long time and may have difficulty with follow-up.  Have also given her the number to the ENT clinic in case she is unable to see her PCP.  Discussed reasons to return to the ED.          I have reviewed the nursing notes.    I have reviewed the findings, diagnosis, plan and need for follow up with the patient.    Discharge Medication List as of 1/11/2022  5:31 PM      START taking these medications    Details   ofloxacin (FLOXIN) 0.3 % otic solution Place 5 drops in ear(s) 2 times daily for 7 days, Disp-5 mL, R-0, Local Print             Final diagnoses:   Infective otitis externa, right     Marisol DE LEON, am serving as a trained medical scribe to document services personally performed by Ethan Penn DO based on the provider's statements to me on January 11, 2022.  This document has been checked and approved by the attending provider.    IEthan DO, was physically present and have  reviewed and verified the accuracy of this note documented by Marisol Hale, medical scribe.      Ethan Penn DO   1/11/2022   [facility] EMERGENCY DEPARTMENT     Ethan Penn DO  01/11/22 2006

## 2022-02-05 ENCOUNTER — APPOINTMENT (OUTPATIENT)
Dept: CT IMAGING | Facility: CLINIC | Age: 37
End: 2022-02-05
Attending: STUDENT IN AN ORGANIZED HEALTH CARE EDUCATION/TRAINING PROGRAM
Payer: COMMERCIAL

## 2022-02-05 ENCOUNTER — HOSPITAL ENCOUNTER (EMERGENCY)
Facility: CLINIC | Age: 37
Discharge: HOME | End: 2022-02-05
Attending: STUDENT IN AN ORGANIZED HEALTH CARE EDUCATION/TRAINING PROGRAM | Admitting: STUDENT IN AN ORGANIZED HEALTH CARE EDUCATION/TRAINING PROGRAM
Payer: COMMERCIAL

## 2022-02-05 VITALS
SYSTOLIC BLOOD PRESSURE: 109 MMHG | OXYGEN SATURATION: 99 % | WEIGHT: 222.9 LBS | BODY MASS INDEX: 32.92 KG/M2 | TEMPERATURE: 98.7 F | HEART RATE: 82 BPM | RESPIRATION RATE: 16 BRPM | DIASTOLIC BLOOD PRESSURE: 78 MMHG

## 2022-02-05 DIAGNOSIS — R51.9 ACUTE NONINTRACTABLE HEADACHE, UNSPECIFIED HEADACHE TYPE: ICD-10-CM

## 2022-02-05 DIAGNOSIS — S09.8XXA BLUNT HEAD TRAUMA, INITIAL ENCOUNTER: Primary | ICD-10-CM

## 2022-02-05 DIAGNOSIS — R42 DIZZINESS: ICD-10-CM

## 2022-02-05 DIAGNOSIS — K08.89 TOOTHACHE: ICD-10-CM

## 2022-02-05 DIAGNOSIS — N39.0 URINARY TRACT INFECTION WITH HEMATURIA, SITE UNSPECIFIED: ICD-10-CM

## 2022-02-05 DIAGNOSIS — R31.9 URINARY TRACT INFECTION WITH HEMATURIA, SITE UNSPECIFIED: ICD-10-CM

## 2022-02-05 LAB
ALBUMIN UR-MCNC: 10 MG/DL
APPEARANCE UR: CLEAR
BILIRUB UR QL STRIP: NEGATIVE
COLOR UR AUTO: ABNORMAL
GLUCOSE UR STRIP-MCNC: NEGATIVE MG/DL
HCG UR QL: NEGATIVE
HGB UR QL STRIP: ABNORMAL
KETONES UR STRIP-MCNC: NEGATIVE MG/DL
LEUKOCYTE ESTERASE UR QL STRIP: ABNORMAL
MUCOUS THREADS #/AREA URNS LPF: PRESENT /LPF
NITRATE UR QL: NEGATIVE
PH UR STRIP: 6.5 [PH] (ref 5–7)
RBC URINE: 4 /HPF
SP GR UR STRIP: 1.03 (ref 1–1.03)
SQUAMOUS EPITHELIAL: 7 /HPF
UROBILINOGEN UR STRIP-MCNC: NORMAL MG/DL
WBC URINE: 4 /HPF

## 2022-02-05 PROCEDURE — 250N000013 HC RX MED GY IP 250 OP 250 PS 637: Performed by: STUDENT IN AN ORGANIZED HEALTH CARE EDUCATION/TRAINING PROGRAM

## 2022-02-05 PROCEDURE — 99284 EMERGENCY DEPT VISIT MOD MDM: CPT | Performed by: STUDENT IN AN ORGANIZED HEALTH CARE EDUCATION/TRAINING PROGRAM

## 2022-02-05 PROCEDURE — 99284 EMERGENCY DEPT VISIT MOD MDM: CPT | Mod: 25 | Performed by: STUDENT IN AN ORGANIZED HEALTH CARE EDUCATION/TRAINING PROGRAM

## 2022-02-05 PROCEDURE — 81001 URINALYSIS AUTO W/SCOPE: CPT | Performed by: STUDENT IN AN ORGANIZED HEALTH CARE EDUCATION/TRAINING PROGRAM

## 2022-02-05 PROCEDURE — 70450 CT HEAD/BRAIN W/O DYE: CPT

## 2022-02-05 PROCEDURE — 72125 CT NECK SPINE W/O DYE: CPT

## 2022-02-05 PROCEDURE — 81025 URINE PREGNANCY TEST: CPT | Performed by: STUDENT IN AN ORGANIZED HEALTH CARE EDUCATION/TRAINING PROGRAM

## 2022-02-05 RX ORDER — CEFDINIR 300 MG/1
300 CAPSULE ORAL 2 TIMES DAILY
Qty: 14 CAPSULE | Refills: 0 | Status: SHIPPED | OUTPATIENT
Start: 2022-02-05 | End: 2022-02-12

## 2022-02-05 RX ORDER — ACETAMINOPHEN 500 MG
1000 TABLET ORAL ONCE
Status: DISCONTINUED | OUTPATIENT
Start: 2022-02-05 | End: 2022-02-05

## 2022-02-05 RX ORDER — IBUPROFEN 600 MG/1
600 TABLET, FILM COATED ORAL ONCE
Status: COMPLETED | OUTPATIENT
Start: 2022-02-05 | End: 2022-02-05

## 2022-02-05 RX ADMIN — IBUPROFEN 600 MG: 600 TABLET ORAL at 21:45

## 2022-02-05 ASSESSMENT — ENCOUNTER SYMPTOMS
HEADACHES: 1
FLANK PAIN: 1
DIZZINESS: 1

## 2022-02-06 NOTE — DISCHARGE INSTRUCTIONS
Many of these clinics offer a sliding fee option for patients that qualify, and see patients on a walk-in or same day basis. Please call each clinic directly. As services, hours, fees and policies vary greatly.      [place]:  [facility]     [phone]  [facility] ([facility]) [phone]  [facility]  [phone]  [facility]      [phone]   [facility]    [phone]  [facility]  [phone]  [facility]) [phone]  [facility]     [phone]  [facility]   [phone]  [facility] (cash only)   [phone]  [facility]    [phone] (adults)          [phone] (children)    [place]:  [facility]     [phone]; [phone]  [facility]     [phone]  [facility]     [phone]  [facility] (free, limited)    [phone]    Multiple Locations:  [facility]       [phone]

## 2022-02-06 NOTE — ED TRIAGE NOTES
Fell last night and hit back of head on floor.  Feels dizzy this evening and pain on head.    Tooth pain unrelated to fall.

## 2022-02-06 NOTE — ED PROVIDER NOTES
ED Provider Note  [facility]      History     Chief Complaint   Patient presents with     Fall     HPI  Miguel Madera is a 37 year old female who reports no previous past medical history who presents to the emergency department today for dizziness after mechanical fall yesterday.  She states that she was walking on a floor in her socks when she had a mechanical slip and fell backwards striking her head.  No LOC, nausea, vomiting at that time, however has felt progressively dizzy and not like herself.  Also reports some right-sided tooth pain, reports having an appointment for root canal on Monday.  Did also reports some flank pain yesterday but no flank pain today.  No dysuria, fever, chills, chest pain, abdominal pain, other physical complaints.    This problem is acute, sudden onset, constant, worsening, new.  I have reviewed her past medical, past social, and past surgical history, the relevant details of which are listed in the above HPI.    Past Medical History  Past Medical History:   Diagnosis Date     NO ACTIVE PROBLEMS      Pneumonia      Past Surgical History:   Procedure Laterality Date     No Surgical History       acetaminophen (TYLENOL) 500 MG tablet  amoxicillin (AMOXIL) 500 MG tablet  cefdinir (OMNICEF) 300 MG capsule  docusate sodium (COLACE) 100 MG tablet  ibuprofen (ADVIL/MOTRIN) 600 MG tablet  methylcellulose (CITRUCEL) 500 MG TABS tablet  Ondansetron HCl (ZOFRAN PO)  oxyCODONE-acetaminophen (PERCOCET) 5-325 MG per tablet  oxyCODONE-acetaminophen (PERCOCET) 5-325 MG tablet  polyvinyl alcohol (LIQUIFILM TEARS) 1.4 % ophthalmic solution  Prenatal Vit-Fe Fumarate-FA (PRENATAL MULTIVITAMIN  PLUS IRON) 27-0.8 MG TABS      No Known Allergies  Family History  No family history on file.  Social History   Social History     Tobacco Use     Smoking status: Never Smoker     Smokeless tobacco: Never Used   Substance Use Topics     Alcohol use: No     Drug use: No      Past medical  history, past surgical history, medications, allergies, family history, and social history were reviewed with the patient. No additional pertinent items.       Review of Systems   Genitourinary: Positive for flank pain.   Neurological: Positive for dizziness and headaches.   All other systems reviewed and are negative.    A complete review of systems was performed with pertinent positives and negatives noted in the HPI, and all other systems negative.    Physical Exam   BP: 117/83  Pulse: 82  Temp: 98.7  F (37.1  C)  Resp: 16  Weight: 101.1 kg (222 lb 14.4 oz)  SpO2: 99 %  Physical Exam  Vitals and nursing note reviewed.   Constitutional:       Appearance: She is not toxic-appearing.   HENT:      Head: Normocephalic and atraumatic.      Comments: Negative trivedi sign, raccoon eyes bilaterally     Nose: Nose normal.      Comments: No septal hematoma  Eyes:      Extraocular Movements: Extraocular movements intact.      Conjunctiva/sclera: Conjunctivae normal.   Cardiovascular:      Rate and Rhythm: Normal rate and regular rhythm.   Pulmonary:      Effort: Pulmonary effort is normal. No respiratory distress.      Breath sounds: Normal breath sounds.   Chest:      Chest wall: No tenderness.   Abdominal:      General: There is no distension.      Palpations: Abdomen is soft.      Tenderness: There is no abdominal tenderness. There is no right CVA tenderness or left CVA tenderness.   Musculoskeletal:         General: No tenderness, deformity or signs of injury.      Cervical back: Neck supple. No tenderness.      Comments: No midline or paraspinal back tenderness to palpation.   Skin:     General: Skin is warm.      Findings: No rash.   Neurological:      General: No focal deficit present.      Mental Status: She is alert. Mental status is at baseline.      Comments: GCS 15, no facial droop, moving all extremities with equal strength, normal gait   Psychiatric:         Mood and Affect: Mood normal.         Behavior:  Behavior normal.       ED Course      Procedures       The medical record was reviewed and interpreted.  Current labs reviewed and interpreted.  Managed outpatient prescription medications.              Results for orders placed or performed during the hospital encounter of 02/05/22   Head CT w/o contrast     Status: None    Narrative    EXAM: CT HEAD W/O CONTRAST  LOCATION: [facility]  DATE/TIME: 2/5/2022 9:03 PM    INDICATION: Dizziness, non-specific  COMPARISON: None.  TECHNIQUE: Routine CT Head without IV contrast. Multiplanar reformats. Dose reduction techniques were used.    FINDINGS:  INTRACRANIAL CONTENTS: No intracranial hemorrhage, extraaxial collection, or mass effect.  No CT evidence of acute infarct. Normal parenchymal attenuation. Normal ventricles and sulci.     VISUALIZED ORBITS/SINUSES/MASTOIDS: No intraorbital abnormality. No paranasal sinus mucosal disease. No middle ear or mastoid effusion.    BONES/SOFT TISSUES: No acute abnormality.      Impression    IMPRESSION:  1.  No evidence of an acute intracranial abnormality.   Cervical spine CT w/o contrast     Status: None    Narrative    EXAM: CT CERVICAL SPINE W/O CONTRAST  LOCATION: [facility]  DATE/TIME: 2/5/2022 9:03 PM    INDICATION: Neck trauma, midline tenderness (Age 16-64y)  COMPARISON: None.  TECHNIQUE: Routine CT Cervical Spine without IV contrast. Multiplanar reformats. Dose reduction techniques were used.    FINDINGS:    The occipital condyles are intact. Vertebral body heights are unremarkable no acute displaced fractures are demonstrated.    There is straightening of the normal cervical lordosis. No significant spondylolisthesis is demonstrated.    No attenuation abnormalities of the spinal canal are demonstrated to suggest epidural.    There is mild spondylosis. There is no significant spinal canal stenosis. No high-grade neural foraminal  stenosis is demonstrated.    The soft tissues are unremarkable. Soft tissues are not thickened by measurement criteria.    The imaged lung apices are clear.      Impression    IMPRESSION:  1.  No evidence of acute displaced fracture.  2.  No evidence of traumatic subluxation.   UA with Microscopic reflex to Culture     Status: Abnormal    Specimen: Urine, Clean Catch   Result Value Ref Range    Color Urine Light Yellow Colorless, Straw, Light Yellow, Yellow    Appearance Urine Clear Clear    Glucose Urine Negative Negative mg/dL    Bilirubin Urine Negative Negative    Ketones Urine Negative Negative mg/dL    Specific Gravity Urine 1.029 1.003 - 1.035    Blood Urine Moderate (A) Negative    pH Urine 6.5 5.0 - 7.0    Protein Albumin Urine 10  (A) Negative mg/dL    Urobilinogen Urine Normal Normal, 2.0 mg/dL    Nitrite Urine Negative Negative    Leukocyte Esterase Urine Small (A) Negative    Mucus Urine Present (A) None Seen /LPF    RBC Urine 4 (H) <=2 /HPF    WBC Urine 4 <=5 /HPF    Squamous Epithelials Urine 7 (H) <=1 /HPF    Narrative    Urine Culture not indicated   HCG qualitative urine (UPT)     Status: Normal   Result Value Ref Range    hCG Urine Qualitative Negative Negative     Medications   ibuprofen (ADVIL/MOTRIN) tablet 600 mg (600 mg Oral Given 2/5/22 2145)        Assessments & Plan (with Medical Decision Making)   MDM:    37 year old F with no reported previous past medical history who presents to the emergency department today for posterior headache and dizziness after mechanical ground-level fall yesterday.  CT of her head and C-spine unremarkable for acute pathology, C-spine clinically clear.  For her pain and for her toothache given ibuprofen with improvement.  Urine pregnancy test is negative, urine with some blood, positive leukocyte esterase concerning for possible hemorrhagic cystitis, will treat with course of cefdinir as she reported flank pain yesterday.  Given resources for other dental clinic  at her request.    On reevaluation she is clinically improved.  I discussed all test results and the plan of care with the patient, who is agreeable to discharge with outpatient follow-up.  Strict return precautions given and all questions answered prior to discharge.    I have reviewed the nursing notes. I have reviewed the findings, diagnosis, plan and need for follow up with the patient.    New Prescriptions    CEFDINIR (OMNICEF) 300 MG CAPSULE    Take 1 capsule (300 mg) by mouth 2 times daily for 7 days       Final diagnoses:   Blunt head trauma, initial encounter   Dizziness   Urinary tract infection with hematuria, site unspecified   Acute nonintractable headache, unspecified headache type   Toothache       --  Salome Davis MD  [facility] EMERGENCY DEPARTMENT  2/5/2022

## 2022-02-27 ENCOUNTER — HOSPITAL ENCOUNTER (EMERGENCY)
Facility: CLINIC | Age: 37
Discharge: HOME | End: 2022-02-27
Attending: EMERGENCY MEDICINE | Admitting: EMERGENCY MEDICINE
Payer: COMMERCIAL

## 2022-02-27 VITALS
SYSTOLIC BLOOD PRESSURE: 131 MMHG | WEIGHT: 220 LBS | BODY MASS INDEX: 32.49 KG/M2 | OXYGEN SATURATION: 100 % | DIASTOLIC BLOOD PRESSURE: 88 MMHG | HEART RATE: 80 BPM | TEMPERATURE: 98.4 F | RESPIRATION RATE: 16 BRPM

## 2022-02-27 DIAGNOSIS — M54.6 ACUTE BILATERAL THORACIC BACK PAIN: ICD-10-CM

## 2022-02-27 LAB
ALBUMIN UR-MCNC: 10 MG/DL
APPEARANCE UR: CLEAR
BILIRUB UR QL STRIP: NEGATIVE
COLOR UR AUTO: YELLOW
GLUCOSE UR STRIP-MCNC: NEGATIVE MG/DL
HCG UR QL: NEGATIVE
HGB UR QL STRIP: NEGATIVE
KETONES UR STRIP-MCNC: NEGATIVE MG/DL
LEUKOCYTE ESTERASE UR QL STRIP: NEGATIVE
MUCOUS THREADS #/AREA URNS LPF: PRESENT /LPF
NITRATE UR QL: NEGATIVE
PH UR STRIP: 5.5 [PH] (ref 5–7)
RBC URINE: 2 /HPF
SP GR UR STRIP: 1.03 (ref 1–1.03)
SQUAMOUS EPITHELIAL: 4 /HPF
UROBILINOGEN UR STRIP-MCNC: NORMAL MG/DL
WBC URINE: 1 /HPF

## 2022-02-27 PROCEDURE — 99283 EMERGENCY DEPT VISIT LOW MDM: CPT | Performed by: EMERGENCY MEDICINE

## 2022-02-27 PROCEDURE — 250N000013 HC RX MED GY IP 250 OP 250 PS 637: Performed by: EMERGENCY MEDICINE

## 2022-02-27 PROCEDURE — 81001 URINALYSIS AUTO W/SCOPE: CPT | Performed by: EMERGENCY MEDICINE

## 2022-02-27 PROCEDURE — 87086 URINE CULTURE/COLONY COUNT: CPT | Performed by: EMERGENCY MEDICINE

## 2022-02-27 PROCEDURE — 99284 EMERGENCY DEPT VISIT MOD MDM: CPT | Performed by: EMERGENCY MEDICINE

## 2022-02-27 PROCEDURE — 81025 URINE PREGNANCY TEST: CPT | Performed by: EMERGENCY MEDICINE

## 2022-02-27 RX ORDER — TIZANIDINE 2 MG/1
2 TABLET ORAL 3 TIMES DAILY
Qty: 30 TABLET | Refills: 0 | Status: SHIPPED | OUTPATIENT
Start: 2022-02-27

## 2022-02-27 RX ORDER — NAPROXEN 500 MG/1
500 TABLET ORAL 2 TIMES DAILY WITH MEALS
Qty: 30 TABLET | Refills: 0 | Status: SHIPPED | OUTPATIENT
Start: 2022-02-27 | End: 2022-03-14

## 2022-02-27 RX ORDER — IBUPROFEN 600 MG/1
600 TABLET, FILM COATED ORAL ONCE
Status: COMPLETED | OUTPATIENT
Start: 2022-02-27 | End: 2022-02-27

## 2022-02-27 RX ADMIN — IBUPROFEN 600 MG: 600 TABLET ORAL at 18:26

## 2022-02-27 ASSESSMENT — ENCOUNTER SYMPTOMS
BACK PAIN: 1
DYSURIA: 1
FLANK PAIN: 1

## 2022-02-27 NOTE — ED PROVIDER NOTES
[facility] EMERGENCY DEPARTMENT ([facility])       2/27/22  History     Chief Complaint   Patient presents with     Back Pain     seen three weeks ago for back pain; also c/o lower ab pain which she had infection; has agian?     The history is provided by the patient and medical records.     Miguel Madera is a 37 year old female with a past medical history significant for thoracic vertebral fraction who presents to the Emergency Department for evaluation of thoracic back pain and left flank pain.  Patient states that she fell a couple of weeks ago which she was treated for.  She states that this back pain started 2 nights ago.  No fall or trauma.  Endorses dysuria.  States she took Tylenol for the pain, has not tried ibuprofen or muscle relaxers.  Pain is been constant since it started.  Nothing was making it better or worse.    Per chart review, patient was seen at [facility] ED on 02/05/2022 for evaluation of dizziness after mechanical fall. CT of her head and C-spine unremarkable for acute pathology, C-spine clinically clear. Urine pregnancy test was negative, urine with some blood, positive leukocyte esterase concerning for possible hemorrhagic cystitis, will treat with course of cefdinir .     EXAM: CT CERVICAL SPINE W/O CONTRAST  LOCATION: [facility]  DATE/TIME: 2/5/2022 9:03 PM  IMPRESSION:  1.  No evidence of acute displaced fracture.  2.  No evidence of traumatic subluxation.    EXAM: CT HEAD W/O CONTRAST  LOCATION: [facility]  DATE/TIME: 2/5/2022 9:03 PM  IMPRESSION:  1.  No evidence of an acute intracranial abnormality    Past Medical History:   Diagnosis Date     NO ACTIVE PROBLEMS      Pneumonia        Past Surgical History:   Procedure Laterality Date     No Surgical History         No family history on file.    Social History     Tobacco Use     Smoking status: Never Smoker     Smokeless tobacco: Never Used    Substance Use Topics     Alcohol use: No       No current facility-administered medications for this encounter.     Current Outpatient Medications   Medication     acetaminophen (TYLENOL) 500 MG tablet     naproxen (NAPROSYN) 500 MG tablet     tiZANidine (ZANAFLEX) 2 MG tablet     amoxicillin (AMOXIL) 500 MG tablet     docusate sodium (COLACE) 100 MG tablet     ibuprofen (ADVIL/MOTRIN) 600 MG tablet     methylcellulose (CITRUCEL) 500 MG TABS tablet     Ondansetron HCl (ZOFRAN PO)     oxyCODONE-acetaminophen (PERCOCET) 5-325 MG per tablet     oxyCODONE-acetaminophen (PERCOCET) 5-325 MG tablet     polyvinyl alcohol (LIQUIFILM TEARS) 1.4 % ophthalmic solution     Prenatal Vit-Fe Fumarate-FA (PRENATAL MULTIVITAMIN  PLUS IRON) 27-0.8 MG TABS      No Known Allergies     I have reviewed the Medications, Allergies, Past Medical and Surgical History, and Social History in the Epic system.    Review of Systems   Genitourinary: Positive for dysuria and flank pain.   Musculoskeletal: Positive for back pain.   All other systems reviewed and are negative.    A complete review of systems was performed with pertinent positives and negatives noted in the HPI, and all other systems negative.    Physical Exam   BP: 117/83  Pulse: 91  Temp: 98.4  F (36.9  C)  Resp: 16  Weight: 99.8 kg (220 lb)  SpO2: 97 %      Physical Exam  Vitals and nursing note reviewed.   Constitutional:       General: She is not in acute distress.     Appearance: She is well-developed. She is not ill-appearing.   HENT:      Head: Normocephalic and atraumatic.      Right Ear: External ear normal.      Left Ear: External ear normal.      Nose: Nose normal.   Eyes:      Extraocular Movements: Extraocular movements intact.      Conjunctiva/sclera: Conjunctivae normal.   Pulmonary:      Effort: Pulmonary effort is normal. No respiratory distress.   Abdominal:      General: There is no distension.   Musculoskeletal:         General: No swelling or deformity.       Cervical back: Normal range of motion and neck supple.      Comments: Diffuse thoracic back tenderness   Skin:     General: Skin is warm and dry.   Neurological:      Mental Status: Mental status is at baseline.      Comments: Alert, oriented   Psychiatric:         Mood and Affect: Mood normal.         Behavior: Behavior normal.         ED Course     At 6:08 PM the patient was seen and examined by Richard De Santiago DO in Room ED03.        Procedures       Results for orders placed or performed during the hospital encounter of 02/27/22   UA with Microscopic reflex to Culture     Status: Abnormal    Specimen: Urine, Clean Catch   Result Value Ref Range    Color Urine Yellow Colorless, Straw, Light Yellow, Yellow    Appearance Urine Clear Clear    Glucose Urine Negative Negative mg/dL    Bilirubin Urine Negative Negative    Ketones Urine Negative Negative mg/dL    Specific Gravity Urine 1.032 1.003 - 1.035    Blood Urine Negative Negative    pH Urine 5.5 5.0 - 7.0    Protein Albumin Urine 10  (A) Negative mg/dL    Urobilinogen Urine Normal Normal, 2.0 mg/dL    Nitrite Urine Negative Negative    Leukocyte Esterase Urine Negative Negative    Mucus Urine Present (A) None Seen /LPF    RBC Urine 2 <=2 /HPF    WBC Urine 1 <=5 /HPF    Squamous Epithelials Urine 4 (H) <=1 /HPF    Narrative    Urine Culture not indicated   HCG qualitative urine (UPT)     Status: Normal   Result Value Ref Range    hCG Urine Qualitative Negative Negative              Results for orders placed or performed during the hospital encounter of 02/27/22 (from the past 24 hour(s))   UA with Microscopic reflex to Culture    Specimen: Urine, Clean Catch   Result Value Ref Range    Color Urine Yellow Colorless, Straw, Light Yellow, Yellow    Appearance Urine Clear Clear    Glucose Urine Negative Negative mg/dL    Bilirubin Urine Negative Negative    Ketones Urine Negative Negative mg/dL    Specific Gravity Urine 1.032 1.003 - 1.035    Blood Urine Negative  Negative    pH Urine 5.5 5.0 - 7.0    Protein Albumin Urine 10  (A) Negative mg/dL    Urobilinogen Urine Normal Normal, 2.0 mg/dL    Nitrite Urine Negative Negative    Leukocyte Esterase Urine Negative Negative    Mucus Urine Present (A) None Seen /LPF    RBC Urine 2 <=2 /HPF    WBC Urine 1 <=5 /HPF    Squamous Epithelials Urine 4 (H) <=1 /HPF    Narrative    Urine Culture not indicated   HCG qualitative urine (UPT)   Result Value Ref Range    hCG Urine Qualitative Negative Negative     Medications   ibuprofen (ADVIL/MOTRIN) tablet 600 mg (600 mg Oral Given 2/27/22 1826)             Assessments & Plan (with Medical Decision Making)   37-year-old female presents to us with a chief complaint of back pain and possible dysuria.  Differential includes but not limited to UTI, pyelonephritis, musculoskeletal back pain.  Vital signs today were unremarkable.  Labs were reviewed and urine was clear.  Previous records reviewed and showed a UTI was present at her visit a few weeks ago.  This appears to have improved.  Patient was given ibuprofen which did help with her symptoms.  No need for further imaging as there is no trauma that occurred today.  We will give the patient is prescription for Naprosyn and Zanaflex to help with her symptoms.  Urine culture was also sent and patient will be contacted if it does grow out any bacteria.  Patient is comfortable discharge home and the plan    I have reviewed the nursing notes.    I have reviewed the findings, diagnosis, plan and need for follow up with the patient.    Discharge Medication List as of 2/27/2022  6:52 PM      START taking these medications    Details   naproxen (NAPROSYN) 500 MG tablet Take 1 tablet (500 mg) by mouth 2 times daily (with meals) for 15 days, Disp-30 tablet, R-0, Local Print      tiZANidine (ZANAFLEX) 2 MG tablet Take 1 tablet (2 mg) by mouth 3 times daily, Disp-30 tablet, R-0, Local Print             Final diagnoses:   Acute bilateral thoracic back pain        I, Natividad Rojas, am serving as a trained medical scribe to document services personally performed by  Richard De Santiago DO, based on the provider's statements to me.      I, Richard De Santiago DO, was physically present and have reviewed and verified the accuracy of this note documented by Natividad Rojas.       Richard De Santiago DO  2/27/2022   [facility] EMERGENCY DEPARTMENT     Richard De Santiago DO  02/27/22 2016

## 2022-02-28 NOTE — DISCHARGE INSTRUCTIONS
Your urine sample today was normal.  There was no infection.  Follow-up with your primary care provider.  Return to the emergency department as needed for any new or worsening symptoms.

## 2022-03-01 LAB — BACTERIA UR CULT: NORMAL

## 2022-03-05 ENCOUNTER — HOSPITAL ENCOUNTER (EMERGENCY)
Facility: CLINIC | Age: 37
Discharge: HOME | End: 2022-03-05
Attending: EMERGENCY MEDICINE | Admitting: EMERGENCY MEDICINE
Payer: COMMERCIAL

## 2022-03-05 VITALS
HEIGHT: 67 IN | WEIGHT: 222.6 LBS | SYSTOLIC BLOOD PRESSURE: 104 MMHG | OXYGEN SATURATION: 99 % | RESPIRATION RATE: 16 BRPM | BODY MASS INDEX: 34.94 KG/M2 | DIASTOLIC BLOOD PRESSURE: 70 MMHG | TEMPERATURE: 98.4 F | HEART RATE: 81 BPM

## 2022-03-05 DIAGNOSIS — S39.011A STRAIN OF ABDOMINAL MUSCLE, INITIAL ENCOUNTER: ICD-10-CM

## 2022-03-05 DIAGNOSIS — H92.12 DRAINAGE FROM LEFT EAR: ICD-10-CM

## 2022-03-05 DIAGNOSIS — H60.502 ACUTE OTITIS EXTERNA OF LEFT EAR, UNSPECIFIED TYPE: ICD-10-CM

## 2022-03-05 PROCEDURE — 99283 EMERGENCY DEPT VISIT LOW MDM: CPT | Performed by: EMERGENCY MEDICINE

## 2022-03-05 PROCEDURE — 99284 EMERGENCY DEPT VISIT MOD MDM: CPT | Performed by: EMERGENCY MEDICINE

## 2022-03-05 RX ORDER — NEOMYCIN SULFATE, POLYMYXIN B SULFATE, HYDROCORTISONE 3.5; 10000; 1 MG/ML; [USP'U]/ML; MG/ML
3 SOLUTION/ DROPS AURICULAR (OTIC) 4 TIMES DAILY
Qty: 10 ML | Refills: 0 | Status: SHIPPED | OUTPATIENT
Start: 2022-03-05

## 2022-03-05 RX ORDER — IBUPROFEN 600 MG/1
600 TABLET, FILM COATED ORAL EVERY 6 HOURS PRN
Qty: 30 TABLET | Refills: 0 | Status: SHIPPED | OUTPATIENT
Start: 2022-03-05

## 2022-03-06 NOTE — DISCHARGE INSTRUCTIONS
TODAY'S VISIT:  You were seen today for left ear drainage    - Applying heat to the painful area on your abdomen may help alleviate pain by helping with muscle relaxation. For this you can use a heating pad or warm bath/shower. If you do not have a heating pad, you can make a warm compress using an old sock filled with uncooked rice placed in the microwave for 1-2 minutes.  -   - If you had any labs or imaging/radiology tests performed today, you should also discuss these tests with your usual provider.     FOLLOW-UP:  Please make an appointment to follow up with:  - Your Primary Care Provider. If you do not have a PCP, please call the Primary Care Center (phone: [phone] for an appointment  - your dentist regarding your tooth pain    - Have your provider review the results from today's visit with you again to make sure no further follow-up or additional testing is needed based on those results.     RETURN TO THE EMERGENCY DEPARTMENT  Return to the Emergency Department at any time for any new or worsening symptoms or any concerns.

## 2022-03-06 NOTE — ED PROVIDER NOTES
History     Chief Complaint   Patient presents with     Otalgia     HPI  Miguel Madera is a 37 year old female with a past medical history of recent left upper dental extraction who presents to the emergency department with a chief complaint of ear pain.  Located in the left ear.  Started yesterday evening.  It is associated with cloudy drainage from the ear and mild pain.  The patient also notes she still has some pain at the tooth extraction site and is unsure if there is still a fragment of the tooth left behind as she thinks she sees something in the socket.  No fevers or chills.     Also of note, the patient complains of left abdominal wall pain and can feel a hard knot there.  She states the pain started after lifting something heavy.  She is concerned something is torn inside.      I have reviewed the Medications, Allergies, Past Medical and Surgical History, and Social History in the [employer] system.    Past Medical History:   Diagnosis Date     NO ACTIVE PROBLEMS      Pneumonia      Past Surgical History:   Procedure Laterality Date     No Surgical History       No current facility-administered medications for this encounter.     Current Outpatient Medications   Medication     acetaminophen (TYLENOL) 500 MG tablet     amoxicillin (AMOXIL) 500 MG tablet     docusate sodium (COLACE) 100 MG tablet     ibuprofen (ADVIL/MOTRIN) 600 MG tablet     methylcellulose (CITRUCEL) 500 MG TABS tablet     naproxen (NAPROSYN) 500 MG tablet     Ondansetron HCl (ZOFRAN PO)     oxyCODONE-acetaminophen (PERCOCET) 5-325 MG per tablet     oxyCODONE-acetaminophen (PERCOCET) 5-325 MG tablet     polyvinyl alcohol (LIQUIFILM TEARS) 1.4 % ophthalmic solution     Prenatal Vit-Fe Fumarate-FA (PRENATAL MULTIVITAMIN  PLUS IRON) 27-0.8 MG TABS     tiZANidine (ZANAFLEX) 2 MG tablet     No Known Allergies  Past medical history, past surgical history, medications, and allergies were reviewed with the patient. Additional pertinent items:  "None    Social History     Socioeconomic History     Marital status:      Spouse name: Not on file     Number of children: Not on file     Years of education: Not on file     Highest education level: Not on file   Occupational History     Not on file   Tobacco Use     Smoking status: Never Smoker     Smokeless tobacco: Never Used   Substance and Sexual Activity     Alcohol use: No     Drug use: No     Sexual activity: Yes     Partners: Male   Other Topics Concern     Not on file   Social History Narrative     Not on file     Social Determinants of Health     Financial Resource Strain: Not on file   Food Insecurity: Not on file   Transportation Needs: Not on file   Physical Activity: Not on file   Stress: Not on file   Social Connections: Not on file   Intimate Partner Violence: Not on file   Housing Stability: Not on file     Social history was reviewed with the patient. Additional pertinent items: None    Review of Systems  General: No fevers or chills  Skin: No rash or diaphoresis  Eyes: No eye redness or discharge  Ears/Nose/Throat: See HPI  Respiratory: No cough or SOB  Cardiovascular: No chest pain or palpitations  Gastrointestinal: No nausea, vomiting, or diarrhea  Genitourinary: No urinary frequency, hematuria, or dysuria  Musculoskeletal: No arthralgias or myalgias  Neurologic: No numbness or weakness  Psychiatric: No depression or SI  Hematologic/Lymphatic/Immunologic: No leg swelling, no easy bruising/bleeding  Endocrine: No polyuria/polydypsia    A complete review of systems was performed with pertinent positives and negatives noted in the HPI, and all other systems negative.    Physical Exam   BP: 102/68  Pulse: 82  Temp: 98.8  F (37.1  C)  Resp: 18  Height: 170.2 cm (5' 7\")  Weight: 101 kg (222 lb 9.6 oz)  SpO2: 99 %      General: Well nourished, well developed, NAD  HEENT: EOMI, anicteric. NCAT, MMM, right tympanic membrane and external ear canal appear normal, left tympanic membrane partially " obscured by drainage, but appears normal otherwise, left external ear canal is erythematous with cloudy drainage present consistent with otitis externa.  Left upper molar extraction site with normal appearing surrounding gums, no retained tooth fragment visible on my exam, no active bleeding or drainage.  Neck: no jugular venous distension, supple, nl ROM  Cardiac: Regular rate and rhythm. No murmurs, rubs, or gallops. Normal S1, S2.  Intact peripheral pulses  Pulm: CTAB, no stridor, wheezes, rales, rhonchi  Abd: Soft, mild tenderness to palpation along the left side of abdomen at site of reported injury, no palpable hernia, nondistended. No rebound or guarding  Skin: Warm and dry to the touch.  No rash  Extremities: No LE edema, no cyanosis, w/w/p  Neuro: A&Ox3, no gross focal deficits    ED Course        Procedures                          Labs Ordered and Resulted from Time of ED Arrival to Time of ED Departure - No data to display         No results found for this or any previous visit (from the past 24 hour(s)).    Labs, vital signs, and imaging studies were reviewed by me.    Medications - No data to display    Assessments & Plan (with Medical Decision Making)   Miguel Madera is a 37 year old female who presents to the emergency department with left ear pain and drainage.  Exam is consistent with otitis externa.  Patient to be prescribed antibiotic eardrops to treat this.    Additionally, patient is concerned about abdominal pain.  It started after lifting something heavy.  Abdominal exam is benign, no palpable hernia.  Suspect muscle strain in this area.  Patient to be prescribed ibuprofen and advised to use heat to this area.    Patient's dental extraction site was examined, surrounding gums appear normal.  I cannot see any retained tooth fragments, but patient is advised to follow-up with her dentist if pain persists.    I have reviewed the nursing notes.    I have reviewed the findings, diagnosis, plan and  need for follow up with the patient.    Patient to be discharged home. Advised to follow up with PCP and dentist as directed. To return to ER immediately with any new/worsening symptoms. Plan of care discussed with patient who expresses understanding and agrees with plan of care.    Discharge Medication List as of 3/5/2022  9:47 PM      START taking these medications    Details   !! ibuprofen (ADVIL/MOTRIN) 600 MG tablet Take 1 tablet (600 mg) by mouth every 6 hours as needed, Disp-30 tablet, R-0, Local Print      neomycin-polymyxin-hydrocortisone (CORTISPORIN) 3.5-49663-3 otic solution Place 3 drops Into the left ear 4 times daily, Disp-10 mL, R-0, Local Print       !! - Potential duplicate medications found. Please discuss with provider.          Final diagnoses:   Drainage from left ear   Acute otitis externa of left ear, unspecified type   Strain of abdominal muscle, initial encounter     Lucie Palomo MD  3/5/2022   [facility] EMERGENCY DEPARTMENT     Lucie Palomo MD  03/05/22 2684

## 2022-03-06 NOTE — ED TRIAGE NOTES
Pt presents with left ear pain since last evening, pt states she has left ear drainage, she stated she also recently had a tooth removed on left side this week.

## 2023-02-09 ENCOUNTER — HOSPITAL ENCOUNTER (EMERGENCY)
Facility: CLINIC | Age: 38
Discharge: HOME | End: 2023-02-09
Attending: EMERGENCY MEDICINE | Admitting: EMERGENCY MEDICINE
Payer: COMMERCIAL

## 2023-02-09 VITALS
WEIGHT: 216.4 LBS | TEMPERATURE: 98.4 F | HEART RATE: 96 BPM | RESPIRATION RATE: 16 BRPM | DIASTOLIC BLOOD PRESSURE: 81 MMHG | SYSTOLIC BLOOD PRESSURE: 131 MMHG | HEIGHT: 67 IN | BODY MASS INDEX: 33.97 KG/M2 | OXYGEN SATURATION: 99 %

## 2023-02-09 DIAGNOSIS — N30.00 ACUTE CYSTITIS WITHOUT HEMATURIA: ICD-10-CM

## 2023-02-09 DIAGNOSIS — H60.312 ACUTE DIFFUSE OTITIS EXTERNA OF LEFT EAR: ICD-10-CM

## 2023-02-09 LAB
ALBUMIN UR-MCNC: 50 MG/DL
APPEARANCE UR: ABNORMAL
BACTERIA #/AREA URNS HPF: ABNORMAL /HPF
BILIRUB UR QL STRIP: NEGATIVE
COLOR UR AUTO: YELLOW
GLUCOSE BLDC GLUCOMTR-MCNC: 96 MG/DL (ref 70–99)
GLUCOSE UR STRIP-MCNC: NEGATIVE MG/DL
HCG UR QL: NEGATIVE
HGB UR QL STRIP: NEGATIVE
INTERNAL QC OK POCT: NORMAL
KETONES UR STRIP-MCNC: ABNORMAL MG/DL
LEUKOCYTE ESTERASE UR QL STRIP: ABNORMAL
MUCOUS THREADS #/AREA URNS LPF: PRESENT /LPF
NITRATE UR QL: NEGATIVE
PH UR STRIP: 5.5 [PH] (ref 5–7)
POCT KIT EXPIRATION DATE: NORMAL
POCT KIT LOT NUMBER: NORMAL
RBC URINE: 1 /HPF
SP GR UR STRIP: 1.03 (ref 1–1.03)
SQUAMOUS EPITHELIAL: 16 /HPF
TRANSITIONAL EPI: <1 /HPF
UROBILINOGEN UR STRIP-MCNC: NORMAL MG/DL
WBC URINE: 16 /HPF

## 2023-02-09 PROCEDURE — 82962 GLUCOSE BLOOD TEST: CPT

## 2023-02-09 PROCEDURE — 99284 EMERGENCY DEPT VISIT MOD MDM: CPT

## 2023-02-09 PROCEDURE — 87086 URINE CULTURE/COLONY COUNT: CPT | Performed by: EMERGENCY MEDICINE

## 2023-02-09 PROCEDURE — 81025 URINE PREGNANCY TEST: CPT | Performed by: EMERGENCY MEDICINE

## 2023-02-09 PROCEDURE — 81001 URINALYSIS AUTO W/SCOPE: CPT | Performed by: EMERGENCY MEDICINE

## 2023-02-09 PROCEDURE — 99284 EMERGENCY DEPT VISIT MOD MDM: CPT | Performed by: EMERGENCY MEDICINE

## 2023-02-09 RX ORDER — CIPROFLOXACIN AND DEXAMETHASONE 3; 1 MG/ML; MG/ML
4 SUSPENSION/ DROPS AURICULAR (OTIC) 2 TIMES DAILY
Qty: 7.5 ML | Refills: 0 | Status: SHIPPED | OUTPATIENT
Start: 2023-02-09 | End: 2023-08-02

## 2023-02-09 RX ORDER — NITROFURANTOIN 25; 75 MG/1; MG/1
100 CAPSULE ORAL 2 TIMES DAILY
Qty: 6 CAPSULE | Refills: 0 | Status: SHIPPED | OUTPATIENT
Start: 2023-02-09

## 2023-02-09 ASSESSMENT — ACTIVITIES OF DAILY LIVING (ADL): ADLS_ACUITY_SCORE: 35

## 2023-02-09 NOTE — ED PROVIDER NOTES
"    [facility] EMERGENCY DEPARTMENT ([facility])    2/09/23      ED PROVIDER NOTE      History     Chief Complaint   Patient presents with     Otalgia     The history is provided by the patient and medical records.     Miguel Madera is a 38 year old female with past medical history of pneumonia and GBS bacteriuria who presents to the ED with left ear and request to check her urine.  She says her bilateral ears began to itch so she used Q-tips.  2 days after that the left ear developed sticky, white, and malodorous discharge as well as pain.  She reports a sound \"like water\" in that ear.  She reports fever last night.  She denies right ear pain but it is itchy as well.  Additionally, she notes that she is having itchiness with urination and describes urinary frequency and urgency.  She denies chance of pregnancy and her LMP was 1 week ago.  She thinks she may have had an STD \"a long time ago\".    Past Medical History  Past Medical History:   Diagnosis Date     NO ACTIVE PROBLEMS      Pneumonia      Past Surgical History:   Procedure Laterality Date     No Surgical History       ciprofloxacin-dexamethasone (CIPRODEX) 0.3-0.1 % otic suspension  nitroFURantoin macrocrystal-monohydrate (MACROBID) 100 MG capsule  acetaminophen (TYLENOL) 500 MG tablet  amoxicillin (AMOXIL) 500 MG tablet  docusate sodium (COLACE) 100 MG tablet  ibuprofen (ADVIL/MOTRIN) 600 MG tablet  ibuprofen (ADVIL/MOTRIN) 600 MG tablet  methylcellulose (CITRUCEL) 500 MG TABS tablet  neomycin-polymyxin-hydrocortisone (CORTISPORIN) 3.5-17097-2 otic solution  Ondansetron HCl (ZOFRAN PO)  oxyCODONE-acetaminophen (PERCOCET) 5-325 MG per tablet  oxyCODONE-acetaminophen (PERCOCET) 5-325 MG tablet  polyvinyl alcohol (LIQUIFILM TEARS) 1.4 % ophthalmic solution  Prenatal Vit-Fe Fumarate-FA (PRENATAL MULTIVITAMIN  PLUS IRON) 27-0.8 MG TABS  tiZANidine (ZANAFLEX) 2 MG tablet      No Known Allergies  Family History  No family history on file.  Social History " "  Social History     Tobacco Use     Smoking status: Never     Smokeless tobacco: Never   Substance Use Topics     Alcohol use: No     Drug use: No      Past medical history, past surgical history, medications, allergies, family history, and social history were reviewed with the patient. No additional pertinent items.      A medically appropriate review of systems was performed with pertinent positives and negatives noted in the HPI, and all other systems negative.    Physical Exam   BP: 131/81  Pulse: 96  Temp: 98.4  F (36.9  C)  Resp: 16  Height: 170.2 cm (5' 7\")  Weight: 98.2 kg (216 lb 6.4 oz)  SpO2: 99 %  Physical Exam    Physical Exam   Constitutional:   well nourished, well developed, resting comfortably   HENT:   Head: Normocephalic and atraumatic.   Eyes: Conjunctivae are normal. Pupils are equal, round, and reactive to light.   TMS : right TM is clear with good light reflex; left ear canal swollen and inflammed with whitish watery discharge,  pharynx has no erythema or exudate, mucous membranes are moist, uvula is midline, floor of mouth is soft,  Neck:   no adenopathy, no bony tenderness  Cardiovascular: regular rate and rhythm without murmurs or gallops  Pulmonary/Chest: Clear to auscultation bilaterally, with no wheezes or retractions. No respiratory distress.  GI: Soft with good bowel sounds.  Non-tender, non-distended, with no guarding, no rebound, no peritoneal signs.   Skin: Skin is warm and dry.   Neurological: alert and oriented to person, place, and time. Nonfocal exam  Psychiatric:  normal mood and affect.    ED Course, Procedures, & Data     12:51 PM  The patient was seen and examined by Cynthia Eric MD in Consult Room.     Procedures                     Results for orders placed or performed during the hospital encounter of 02/09/23   UA with Microscopic reflex to Culture     Status: Abnormal    Specimen: Urine, Midstream   Result Value Ref Range    Color Urine Yellow Colorless, Straw, " Light Yellow, Yellow    Appearance Urine Slightly Cloudy (A) Clear    Glucose Urine Negative Negative mg/dL    Bilirubin Urine Negative Negative    Ketones Urine Trace (A) Negative mg/dL    Specific Gravity Urine 1.028 1.003 - 1.035    Blood Urine Negative Negative    pH Urine 5.5 5.0 - 7.0    Protein Albumin Urine 50 (A) Negative mg/dL    Urobilinogen Urine Normal Normal, 2.0 mg/dL    Nitrite Urine Negative Negative    Leukocyte Esterase Urine Moderate (A) Negative    Bacteria Urine Few (A) None Seen /HPF    Mucus Urine Present (A) None Seen /LPF    RBC Urine 1 <=2 /HPF    WBC Urine 16 (H) <=5 /HPF    Squamous Epithelials Urine 16 (H) <=1 /HPF    Transitional Epithelials Urine <1 <=1 /HPF    Narrative    Urine Culture ordered based on laboratory criteria   Glucose by meter     Status: Normal   Result Value Ref Range    GLUCOSE BY METER POCT 96 70 - 99 mg/dL   hCG qual urine POCT     Status: Normal   Result Value Ref Range    HCG Qual Urine Negative Negative    Internal QC Check POCT Valid Valid    POCT Kit Lot Number [id number]     POCT Kit Expiration Date 2024-03-31      Medications - No data to display  Labs Ordered and Resulted from Time of ED Arrival to Time of ED Departure   ROUTINE UA WITH MICROSCOPIC REFLEX TO CULTURE - Abnormal       Result Value    Color Urine Yellow      Appearance Urine Slightly Cloudy (*)     Glucose Urine Negative      Bilirubin Urine Negative      Ketones Urine Trace (*)     Specific Gravity Urine 1.028      Blood Urine Negative      pH Urine 5.5      Protein Albumin Urine 50 (*)     Urobilinogen Urine Normal      Nitrite Urine Negative      Leukocyte Esterase Urine Moderate (*)     Bacteria Urine Few (*)     Mucus Urine Present (*)     RBC Urine 1      WBC Urine 16 (*)     Squamous Epithelials Urine 16 (*)     Transitional Epithelials Urine <1     GLUCOSE BY METER - Normal    GLUCOSE BY METER POCT 96     HCG QUALITATIVE URINE POCT - Normal    HCG Qual Urine Negative      Internal QC  Check POCT Valid      POCT Kit Lot Number [id number]      POCT Kit Expiration Date 2024-03-31     GLUCOSE MONITOR NURSING POCT   URINE CULTURE     No orders to display          Medical Decision Making  The patient's presentation is strongly suggestive of an acute and uncomplicated illness or injury.    The patient's evaluation involved:  ordering and/or review of 1 test(s) in this encounter (see separate area of note for details)    The patient's management involved prescription drug management (including medications given in the ED).      Assessment & Plan        I have reviewed the nursing notes.   Emergency Department course:  The patient was seen and examined at 1250 pm in consult room.   UA is contaminated with squamous epithelial cells but has moderate LCE and WBCs.  The patient is symptomatic.  I believe it is low risk to treat the patient for UTI symptoms for a few days while the urine culture results.  I prescribed nitrofurantoin.  Physical examination suggests that the patient has an otitis externa as etiology for her left ear pain and drainage.  Her Accu-Chek is 96, making malignant otitis externa unlikely..  I will prescribe Ciprodex otic drops for this.  I have instructed her not to use Q-tips, as this may make the symptoms worse.  I discussed all these results with the patient instructions on how to use the prescribed medications    I would like her to follow-up with her regular physician for reevaluation within 1 to 2 weeks.      . I have reviewed the findings, diagnosis, plan and need for follow up with the patient.    New Prescriptions    CIPROFLOXACIN-DEXAMETHASONE (CIPRODEX) 0.3-0.1 % OTIC SUSPENSION    Place 4 drops Into the left ear 2 times daily    NITROFURANTOIN MACROCRYSTAL-MONOHYDRATE (MACROBID) 100 MG CAPSULE    Take 1 capsule (100 mg) by mouth 2 times daily       Final diagnoses:   Acute diffuse otitis externa of left ear   Acute cystitis without hematuria     Natividad DE LEON, am serving as a  trained medical scribe to document services personally performed by yCnthia Eric MD based on the provider's statements to me on February 9, 2023.  This document has been checked and approved by the attending provider.    I, Cynthia Eric MD, was physically present and have reviewed and verified the accuracy of this note documented by Natividad Rojas medical scribe.    This note was created in part by the use of Dragon voice recognition dictation system. Inadvertent grammatical errors and typographical errors may still exist.  MD Cynthia Jordan  [facility] EMERGENCY DEPARTMENT  2/9/2023     Cynthia Eric MD  02/09/23 [id number]

## 2023-02-09 NOTE — DISCHARGE INSTRUCTIONS
Use Ciprodex otic drops to your left ear twice a day.  Do not use Q-tips.  This will make infections worse.  Take nitrofurantoin for UTI symptoms.  Your urine culture is pending.  This should be followed up with your regular doctor.

## 2023-02-09 NOTE — ED TRIAGE NOTES
Pt reports L ear pain for the last 5 days. Pt reports her ear feels itchy, has been cleaning it with a q-tip and now her ear has been having watery discharge. Pt is well appearing, calm and cooperative, talking in full sentences.     On assessment, pt's ear has white/clear liquid present in ear canal.

## 2023-02-11 ENCOUNTER — TELEPHONE (OUTPATIENT)
Dept: EMERGENCY MEDICINE | Facility: CLINIC | Age: 38
End: 2023-02-11
Payer: COMMERCIAL

## 2023-02-11 LAB
BACTERIA UR CULT: ABNORMAL
BACTERIA UR CULT: ABNORMAL

## 2023-02-11 RX ORDER — CEPHALEXIN 500 MG/1
500 CAPSULE ORAL 3 TIMES DAILY
Qty: 15 CAPSULE | Refills: 0 | Status: CANCELLED | OUTPATIENT
Start: 2023-02-11 | End: 2023-02-16

## 2023-02-11 NOTE — TELEPHONE ENCOUNTER
"[facility] Emergency Department Lab result notification    [place] ED lab result protocol used  Urine Culture    Reason for call  Notify of lab results, assess symptoms,  review ED providers recommendations/discharge instructions (if necessary) and advise per ED lab result f/u protocol    Lab Result (including Rx patient on, if applicable)  Final Urine Culture Report on 2/11/23  [facility] Emergency Dept discharge antibiotic prescribed:  Nitrofurantoin Macrocrystal-Monohydrate (Macrobid) 100 mg PO capsule, 1 capsule (100 mg) by mouth 2 times daily for 3 days  #1. Bacteria, 10,000-50,000 CFU/mL Streptococcus agalactiae (Group B Streptococcus),  is [NOT TESTED] to antibiotic.   Recommendations in treatment per [facility] ED lab result Urine Culture protocol.    Information table from Emergency Dept Provider visit on 2/9/23  Symptoms reported at ED visit (Chief complaint, HPI)  Otalgia      The history is provided by the patient and medical records.      Miguel Madera is a 38 year old female with past medical history of pneumonia and GBS bacteriuria who presents to the ED with left ear and request to check her urine.  She says her bilateral ears began to itch so she used Q-tips.  2 days after that the left ear developed sticky, white, and malodorous discharge as well as pain.  She reports a sound \"like water\" in that ear.  She reports fever last night.  She denies right ear pain but it is itchy as well.  Additionally, she notes that she is having itchiness with urination and describes urinary frequency and urgency.  She denies chance of pregnancy and her LMP was 1 week ago.  She thinks she may have had an STD \"a long time ago\".     Significant Medical hx, if applicable (i.e. CKD, diabetes) Reviewed   Allergies No Known Allergies   Weight, if applicable Wt Readings from Last 2 Encounters:   02/09/23 98.2 kg (216 lb 6.4 oz)   03/05/22 101 kg (222 lb 9.6 oz)      Coumadin/Warfarin [Yes /No] No "   Creatinine Level (mg/dl) Creatinine   Date Value Ref Range Status   01/22/2018 0.46 (L) 0.52 - 1.04 mg/dL Final      Creatinine clearance (ml/min), if applicable Creatinine clearance cannot be calculated (Patient's most recent lab result is older than the maximum 30 days allowed.)   Pregnant (Yes/No/NA) Negative per ED lab   Breastfeeding (Yes/No/NA) ?   ED providers Impression and Plan (applicable information) I have reviewed the nursing notes.   Emergency Department course:  The patient was seen and examined at 1250 pm in consult room.   UA is contaminated with squamous epithelial cells but has moderate LCE and WBCs.  The patient is symptomatic.  I believe it is low risk to treat the patient for UTI symptoms for a few days while the urine culture results.  I prescribed nitrofurantoin.  Physical examination suggests that the patient has an otitis externa as etiology for her left ear pain and drainage.  Her Accu-Chek is 96, making malignant otitis externa unlikely..  I will prescribe Ciprodex otic drops for this.  I have instructed her not to use Q-tips, as this may make the symptoms worse.  I discussed all these results with the patient instructions on how to use the prescribed medications     I would like her to follow-up with her regular physician for reevaluation within 1 to 2 weeks.   ED diagnosis Acute diffuse otitis externa of left ear   Acute cystitis without hematuria        ED provider Cynthia Eric MD        RN Assessment (Patient s current Symptoms), include time called.  [Insert Left message here if message left]  3:52p call to patient.  Unable to leave a message      Marii Moseley RN  [facility]  Emergency Dept Lab Result RN  Ph# [phone]     Copy of Lab result

## 2023-02-12 NOTE — TELEPHONE ENCOUNTER
[facility] Emergency Department/Urgent Care Lab result notification     Patient/parent Name  Miguel    RN Assessment (Patient s current Symptoms), include time called.  [Insert Left message here if message left]  10:08a  Discussed with patient urine culture result  She reports no UTI Sx and is feeling better.  Her antibiotic is finished today.  No need to change antibiotic.  Patient stated understanding  Lab result (if applicable):  Final Urine Culture Report on 2/11/23  [facility] Emergency Dept discharge antibiotic prescribed:  Nitrofurantoin Macrocrystal-Monohydrate (Macrobid) 100 mg PO capsule, 1 capsule (100 mg) by mouth 2 times daily for 3 days  #1. Bacteria, 10,000-50,000 CFU/mL Streptococcus agalactiae (Group B Streptococcus),  is [NOT TESTED] to antibiotic.   Recommendations in treatment per [facility] ED lab result Urine Culture protocol.  RN Recommendations/Instructions per [place] ED lab result protocol  Patient notified of lab result and treatment recommendations.    Please Contact your PCP clinic or return to the Emergency department if your:    Symptoms return.    Symptoms do not improve after 3 days on antibiotic.    Symptoms do not resolve after completing antibiotic.    Symptoms worsen or other concerning symptom's.    Marii Moseley, ADRIÁN  [facility] [place]  Emergency Dept Lab Result RN  Ph# [phone]     Copy of Lab result

## 2023-08-02 ENCOUNTER — HOSPITAL ENCOUNTER (EMERGENCY)
Facility: CLINIC | Age: 38
Discharge: HOME | End: 2023-08-02
Attending: FAMILY MEDICINE | Admitting: FAMILY MEDICINE
Payer: COMMERCIAL

## 2023-08-02 VITALS
SYSTOLIC BLOOD PRESSURE: 110 MMHG | TEMPERATURE: 98.2 F | DIASTOLIC BLOOD PRESSURE: 75 MMHG | HEART RATE: 98 BPM | HEIGHT: 67 IN | OXYGEN SATURATION: 99 % | WEIGHT: 223 LBS | RESPIRATION RATE: 17 BRPM | BODY MASS INDEX: 35 KG/M2

## 2023-08-02 DIAGNOSIS — H60.391 INFECTIVE OTITIS EXTERNA, RIGHT: ICD-10-CM

## 2023-08-02 PROCEDURE — 250N000013 HC RX MED GY IP 250 OP 250 PS 637: Performed by: FAMILY MEDICINE

## 2023-08-02 PROCEDURE — 99283 EMERGENCY DEPT VISIT LOW MDM: CPT | Performed by: FAMILY MEDICINE

## 2023-08-02 RX ORDER — CIPROFLOXACIN AND DEXAMETHASONE 3; 1 MG/ML; MG/ML
4 SUSPENSION/ DROPS AURICULAR (OTIC) 2 TIMES DAILY
Qty: 7.5 ML | Refills: 0 | Status: SHIPPED | OUTPATIENT
Start: 2023-08-02

## 2023-08-02 RX ORDER — IBUPROFEN 200 MG
600 TABLET ORAL EVERY 6 HOURS PRN
Qty: 30 TABLET | Refills: 0 | Status: SHIPPED | OUTPATIENT
Start: 2023-08-02

## 2023-08-02 RX ORDER — IBUPROFEN 600 MG/1
600 TABLET, FILM COATED ORAL ONCE
Status: COMPLETED | OUTPATIENT
Start: 2023-08-02 | End: 2023-08-02

## 2023-08-02 RX ADMIN — IBUPROFEN 600 MG: 600 TABLET ORAL at 15:47

## 2023-08-02 NOTE — ED TRIAGE NOTES
Patient presents due to right ear pain for the past 4 days. Patient reports 7/10 squeezing pain with intermittent itching.      Triage Assessment       Row Name 08/02/23 1501       Triage Assessment (Adult)    Airway WDL WDL       Respiratory WDL    Respiratory WDL WDL       Skin Circulation/Temperature WDL    Skin Circulation/Temperature WDL WDL       Cardiac WDL    Cardiac WDL WDL       Peripheral/Neurovascular WDL    Peripheral Neurovascular WDL WDL       Cognitive/Neuro/Behavioral WDL    Cognitive/Neuro/Behavioral WDL WDL

## 2023-08-02 NOTE — ED PROVIDER NOTES
ED Provider Note  [facility]      History     Chief Complaint   Patient presents with    Otalgia     Patient presents due to right ear pain for the past 4 days. Patient reports 7/10 squeezing pain with intermittent itching.      HPI  Miguel Madera is a 38 year old female who presents with right ear pain, itching, drainage from the right ear.  She felt that she had water in her ear and so used a Q-tip.  This caused the pain the worst.  Her ear is popping some when she swallows and she suffers from seasonal allergies with a lot of sneezing.  No fever or chills, vomiting, vertigo or hearing loss.  No tenderness.  No trauma.  Is not diabetic.    Past Medical History  Past Medical History:   Diagnosis Date    NO ACTIVE PROBLEMS     Pneumonia      Past Surgical History:   Procedure Laterality Date    No Surgical History       ciprofloxacin-dexamethasone (CIPRODEX) 0.3-0.1 % otic suspension  ibuprofen (ADVIL/MOTRIN) 200 MG tablet  acetaminophen (TYLENOL) 500 MG tablet  amoxicillin (AMOXIL) 500 MG tablet  docusate sodium (COLACE) 100 MG tablet  ibuprofen (ADVIL/MOTRIN) 600 MG tablet  ibuprofen (ADVIL/MOTRIN) 600 MG tablet  methylcellulose (CITRUCEL) 500 MG TABS tablet  neomycin-polymyxin-hydrocortisone (CORTISPORIN) 3.5-92187-5 otic solution  nitroFURantoin macrocrystal-monohydrate (MACROBID) 100 MG capsule  Ondansetron HCl (ZOFRAN PO)  oxyCODONE-acetaminophen (PERCOCET) 5-325 MG per tablet  oxyCODONE-acetaminophen (PERCOCET) 5-325 MG tablet  polyvinyl alcohol (LIQUIFILM TEARS) 1.4 % ophthalmic solution  Prenatal Vit-Fe Fumarate-FA (PRENATAL MULTIVITAMIN  PLUS IRON) 27-0.8 MG TABS  tiZANidine (ZANAFLEX) 2 MG tablet      No Known Allergies  Family History  History reviewed. No pertinent family history.  Social History   Social History     Tobacco Use    Smoking status: Never    Smokeless tobacco: Never   Substance Use Topics    Alcohol use: No    Drug use: No         A medically appropriate review  "of systems was performed with pertinent positives and negatives noted in the HPI, and all other systems negative.    Physical Exam   BP: 104/75  Pulse: 101  Temp: 98.2  F (36.8  C)  Resp: 16  Height: 170.2 cm (5' 7\")  Weight: 101.2 kg (223 lb)  SpO2: 99 %  Physical Exam  Vitals and nursing note reviewed.   Constitutional:       General: She is not in acute distress.     Appearance: Normal appearance. She is not diaphoretic.   HENT:      Head: Atraumatic.      Right Ear: Drainage, swelling and tenderness present. No middle ear effusion. There is no impacted cerumen. No foreign body. No mastoid tenderness. Tympanic membrane is not injected.      Mouth/Throat:      Mouth: Mucous membranes are moist.   Eyes:      General: No scleral icterus.     Conjunctiva/sclera: Conjunctivae normal.   Cardiovascular:      Rate and Rhythm: Normal rate.      Heart sounds: Normal heart sounds.   Pulmonary:      Effort: No respiratory distress.      Breath sounds: Normal breath sounds.   Abdominal:      General: Abdomen is flat.   Musculoskeletal:      Cervical back: Neck supple.   Skin:     General: Skin is warm.      Findings: No rash.   Neurological:      Mental Status: She is alert.           ED Course, Procedures, & Data      Procedures                     No results found for any visits on 08/02/23.  Medications - No data to display  Labs Ordered and Resulted from Time of ED Arrival to Time of ED Departure - No data to display  No orders to display          Critical care was not performed.     Medical Decision Making  The patient's presentation was of low complexity (an acute and uncomplicated illness or injury).    The patient's evaluation involved:  review of external note(s) from 1 sources (reviewed ED visit in [facility] from February 2023 when she presented with ear pain)    The patient's management necessitated moderate risk (prescription drug management including medications given in the ED).    Assessment & Plan    Otherwise " healthy 38-year-old female who has developed itching and drainage from her right external ear.  Has been using Q-tips.  She is otherwise healthy immunocompetent, no history of diabetes.  No hearing loss or vertigo.  On exam the external ear appears normal but there is whitish watery drainage in the canal.  The tympanic membrane appears normal.  Manger the physical exam is normal.  Exam and history consistent with otitis externa without complication.  Plan for Ciprodex Otic ibuprofen.  We discussed the indications for emergency department return and follow-up.  Stable for discharge.      I have reviewed the nursing notes. I have reviewed the findings, diagnosis, plan and need for follow up with the patient.    New Prescriptions    IBUPROFEN (ADVIL/MOTRIN) 200 MG TABLET    Take 3 tablets (600 mg) by mouth every 6 hours as needed for mild pain       Final diagnoses:   Infective otitis externa, right       Wander Roque Md  [facility] EMERGENCY DEPARTMENT  8/2/2023     Wander Roque MD  08/02/23 1523

## 2023-08-02 NOTE — DISCHARGE INSTRUCTIONS
Thank you for choosing [facility].     Please closely monitor for further symptoms. Return to the Emergency Department if you develop any new or worsening signs or symptoms.    If you received any opiate pain medications or sedatives during your visit, please do not drive for at least 8 hours.     Labs, cultures or final xray interpretations may still need to be reviewed.  We will call you if your plan of care needs to be changed.    Please follow up with your primary care physician or clinic 7 to 10 days if not resolved.

## 2024-01-10 ENCOUNTER — HOSPITAL ENCOUNTER (EMERGENCY)
Facility: CLINIC | Age: 39
Discharge: HOME | End: 2024-01-10
Attending: EMERGENCY MEDICINE | Admitting: EMERGENCY MEDICINE
Payer: COMMERCIAL

## 2024-01-10 ENCOUNTER — APPOINTMENT (OUTPATIENT)
Dept: GENERAL RADIOLOGY | Facility: CLINIC | Age: 39
End: 2024-01-10
Attending: EMERGENCY MEDICINE
Payer: COMMERCIAL

## 2024-01-10 ENCOUNTER — APPOINTMENT (OUTPATIENT)
Dept: CT IMAGING | Facility: CLINIC | Age: 39
End: 2024-01-10
Attending: EMERGENCY MEDICINE
Payer: COMMERCIAL

## 2024-01-10 VITALS
RESPIRATION RATE: 16 BRPM | OXYGEN SATURATION: 99 % | SYSTOLIC BLOOD PRESSURE: 126 MMHG | DIASTOLIC BLOOD PRESSURE: 85 MMHG | HEART RATE: 73 BPM | TEMPERATURE: 98.6 F | BODY MASS INDEX: 34.14 KG/M2 | WEIGHT: 218 LBS

## 2024-01-10 DIAGNOSIS — M54.2 NECK PAIN: ICD-10-CM

## 2024-01-10 DIAGNOSIS — M54.50 ACUTE LEFT-SIDED LOW BACK PAIN WITHOUT SCIATICA: ICD-10-CM

## 2024-01-10 LAB
ALBUMIN UR-MCNC: NEGATIVE MG/DL
APPEARANCE UR: CLEAR
BILIRUB UR QL STRIP: NEGATIVE
COLOR UR AUTO: YELLOW
GLUCOSE UR STRIP-MCNC: NEGATIVE MG/DL
HCG UR QL: NEGATIVE
HGB UR QL STRIP: NEGATIVE
INTERNAL QC OK POCT: NORMAL
KETONES UR STRIP-MCNC: NEGATIVE MG/DL
LEUKOCYTE ESTERASE UR QL STRIP: NEGATIVE
MUCOUS THREADS #/AREA URNS LPF: PRESENT /LPF
NITRATE UR QL: NEGATIVE
PH UR STRIP: 7.5 [PH] (ref 5–7)
POCT KIT EXPIRATION DATE: NORMAL
POCT KIT LOT NUMBER: NORMAL
RBC URINE: <1 /HPF
SP GR UR STRIP: 1.02 (ref 1–1.03)
SQUAMOUS EPITHELIAL: 4 /HPF
UROBILINOGEN UR STRIP-MCNC: NORMAL MG/DL
WBC URINE: <1 /HPF

## 2024-01-10 PROCEDURE — 81025 URINE PREGNANCY TEST: CPT | Performed by: EMERGENCY MEDICINE

## 2024-01-10 PROCEDURE — 81001 URINALYSIS AUTO W/SCOPE: CPT | Performed by: EMERGENCY MEDICINE

## 2024-01-10 PROCEDURE — 99284 EMERGENCY DEPT VISIT MOD MDM: CPT | Mod: 25

## 2024-01-10 PROCEDURE — 250N000013 HC RX MED GY IP 250 OP 250 PS 637: Performed by: EMERGENCY MEDICINE

## 2024-01-10 PROCEDURE — 72125 CT NECK SPINE W/O DYE: CPT

## 2024-01-10 PROCEDURE — 72100 X-RAY EXAM L-S SPINE 2/3 VWS: CPT

## 2024-01-10 PROCEDURE — 99284 EMERGENCY DEPT VISIT MOD MDM: CPT | Performed by: EMERGENCY MEDICINE

## 2024-01-10 RX ORDER — DICLOFENAC EPOLAMINE 0.01 G/1
1 SYSTEM TOPICAL 2 TIMES DAILY
Qty: 15 PATCH | Refills: 0 | Status: SHIPPED | OUTPATIENT
Start: 2024-01-10

## 2024-01-10 RX ORDER — ACETAMINOPHEN 325 MG/1
650 TABLET ORAL EVERY 4 HOURS PRN
Status: DISCONTINUED | OUTPATIENT
Start: 2024-01-10 | End: 2024-01-10 | Stop reason: HOSPADM

## 2024-01-10 RX ADMIN — ACETAMINOPHEN 650 MG: 325 TABLET, FILM COATED ORAL at 19:40

## 2024-01-10 ASSESSMENT — ACTIVITIES OF DAILY LIVING (ADL)
ADLS_ACUITY_SCORE: 35
ADLS_ACUITY_SCORE: 33

## 2024-01-11 NOTE — DISCHARGE INSTRUCTIONS
Your imaging shows no acute fractures.  Please follow-up with your primary care as already scheduled as you may need physical therapy or other interventions to help with your neck and back pain.    Your urine showed no signs of infection.    Please return to the ER if you develop new weakness or new numbness.
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
## 2024-01-11 NOTE — ED TRIAGE NOTES
Triage Assessment (Adult)       Row Name 01/10/24 9995          Triage Assessment    Airway WDL WDL        Respiratory WDL    Respiratory WDL WDL        Skin Circulation/Temperature WDL    Skin Circulation/Temperature WDL WDL        Cardiac WDL    Cardiac WDL WDL        Peripheral/Neurovascular WDL    Peripheral Neurovascular WDL WDL        Cognitive/Neuro/Behavioral WDL    Cognitive/Neuro/Behavioral WDL WDL

## 2024-04-08 ENCOUNTER — HOSPITAL ENCOUNTER (EMERGENCY)
Facility: CLINIC | Age: 39
Discharge: HOME | End: 2024-04-08
Attending: FAMILY MEDICINE | Admitting: FAMILY MEDICINE
Payer: COMMERCIAL

## 2024-04-08 ENCOUNTER — APPOINTMENT (OUTPATIENT)
Dept: GENERAL RADIOLOGY | Facility: CLINIC | Age: 39
End: 2024-04-08
Attending: FAMILY MEDICINE
Payer: COMMERCIAL

## 2024-04-08 VITALS
SYSTOLIC BLOOD PRESSURE: 118 MMHG | DIASTOLIC BLOOD PRESSURE: 80 MMHG | BODY MASS INDEX: 34.66 KG/M2 | TEMPERATURE: 98.6 F | HEART RATE: 101 BPM | WEIGHT: 221.3 LBS | RESPIRATION RATE: 18 BRPM

## 2024-04-08 DIAGNOSIS — J20.9 ACUTE BRONCHITIS, UNSPECIFIED ORGANISM: ICD-10-CM

## 2024-04-08 LAB
ALBUMIN SERPL BCG-MCNC: 3.8 G/DL (ref 3.5–5.2)
ALP SERPL-CCNC: 57 U/L (ref 40–150)
ALT SERPL W P-5'-P-CCNC: 11 U/L (ref 0–50)
ANION GAP SERPL CALCULATED.3IONS-SCNC: 11 MMOL/L (ref 7–15)
AST SERPL W P-5'-P-CCNC: 16 U/L (ref 0–45)
BASOPHILS # BLD AUTO: 0 10E3/UL (ref 0–0.2)
BASOPHILS NFR BLD AUTO: 0 %
BILIRUB SERPL-MCNC: 0.2 MG/DL
BUN SERPL-MCNC: 10.2 MG/DL (ref 6–20)
CALCIUM SERPL-MCNC: 8.7 MG/DL (ref 8.6–10)
CHLORIDE SERPL-SCNC: 103 MMOL/L (ref 98–107)
CREAT SERPL-MCNC: 0.58 MG/DL (ref 0.51–0.95)
CRP SERPL-MCNC: 20.55 MG/L
D DIMER PPP FEU-MCNC: 0.32 UG/ML FEU (ref 0–0.5)
DEPRECATED HCO3 PLAS-SCNC: 24 MMOL/L (ref 22–29)
EGFRCR SERPLBLD CKD-EPI 2021: >90 ML/MIN/1.73M2
EOSINOPHIL # BLD AUTO: 0.3 10E3/UL (ref 0–0.7)
EOSINOPHIL NFR BLD AUTO: 4 %
ERYTHROCYTE [DISTWIDTH] IN BLOOD BY AUTOMATED COUNT: 13 % (ref 10–15)
FLUAV RNA SPEC QL NAA+PROBE: NEGATIVE
FLUAV RNA SPEC QL NAA+PROBE: NEGATIVE
FLUBV RNA RESP QL NAA+PROBE: NEGATIVE
FLUBV RNA RESP QL NAA+PROBE: NEGATIVE
GLUCOSE SERPL-MCNC: 105 MG/DL (ref 70–99)
HCG SERPL QL: NEGATIVE
HCT VFR BLD AUTO: 37.5 % (ref 35–47)
HGB BLD-MCNC: 12.5 G/DL (ref 11.7–15.7)
IMM GRANULOCYTES # BLD: 0 10E3/UL
IMM GRANULOCYTES NFR BLD: 0 %
LYMPHOCYTES # BLD AUTO: 3.7 10E3/UL (ref 0.8–5.3)
LYMPHOCYTES NFR BLD AUTO: 53 %
MAGNESIUM SERPL-MCNC: 2.3 MG/DL (ref 1.7–2.3)
MCH RBC QN AUTO: 28.5 PG (ref 26.5–33)
MCHC RBC AUTO-ENTMCNC: 33.3 G/DL (ref 31.5–36.5)
MCV RBC AUTO: 85 FL (ref 78–100)
MONOCYTES # BLD AUTO: 0.3 10E3/UL (ref 0–1.3)
MONOCYTES NFR BLD AUTO: 4 %
NEUTROPHILS # BLD AUTO: 2.8 10E3/UL (ref 1.6–8.3)
NEUTROPHILS NFR BLD AUTO: 39 %
NRBC # BLD AUTO: 0 10E3/UL
NRBC BLD AUTO-RTO: 0 /100
NT-PROBNP SERPL-MCNC: 49 PG/ML (ref 0–450)
PLATELET # BLD AUTO: 283 10E3/UL (ref 150–450)
POTASSIUM SERPL-SCNC: 3.5 MMOL/L (ref 3.4–5.3)
PROT SERPL-MCNC: 6.7 G/DL (ref 6.4–8.3)
RBC # BLD AUTO: 4.39 10E6/UL (ref 3.8–5.2)
RSV RNA SPEC NAA+PROBE: NEGATIVE
RSV RNA SPEC NAA+PROBE: NEGATIVE
SARS-COV-2 RNA RESP QL NAA+PROBE: NEGATIVE
SARS-COV-2 RNA RESP QL NAA+PROBE: NEGATIVE
SODIUM SERPL-SCNC: 138 MMOL/L (ref 135–145)
TROPONIN T SERPL HS-MCNC: <6 NG/L
WBC # BLD AUTO: 7.1 10E3/UL (ref 4–11)

## 2024-04-08 PROCEDURE — 93010 ELECTROCARDIOGRAM REPORT: CPT | Performed by: FAMILY MEDICINE

## 2024-04-08 PROCEDURE — 93005 ELECTROCARDIOGRAM TRACING: CPT | Performed by: FAMILY MEDICINE

## 2024-04-08 PROCEDURE — 84703 CHORIONIC GONADOTROPIN ASSAY: CPT | Performed by: FAMILY MEDICINE

## 2024-04-08 PROCEDURE — 71046 X-RAY EXAM CHEST 2 VIEWS: CPT

## 2024-04-08 PROCEDURE — 86140 C-REACTIVE PROTEIN: CPT | Performed by: FAMILY MEDICINE

## 2024-04-08 PROCEDURE — 96360 HYDRATION IV INFUSION INIT: CPT | Performed by: FAMILY MEDICINE

## 2024-04-08 PROCEDURE — 96361 HYDRATE IV INFUSION ADD-ON: CPT | Performed by: FAMILY MEDICINE

## 2024-04-08 PROCEDURE — 258N000003 HC RX IP 258 OP 636: Performed by: FAMILY MEDICINE

## 2024-04-08 PROCEDURE — 80053 COMPREHEN METABOLIC PANEL: CPT | Performed by: FAMILY MEDICINE

## 2024-04-08 PROCEDURE — 83735 ASSAY OF MAGNESIUM: CPT | Performed by: FAMILY MEDICINE

## 2024-04-08 PROCEDURE — 99284 EMERGENCY DEPT VISIT MOD MDM: CPT | Mod: 25 | Performed by: FAMILY MEDICINE

## 2024-04-08 PROCEDURE — 94640 AIRWAY INHALATION TREATMENT: CPT | Performed by: FAMILY MEDICINE

## 2024-04-08 PROCEDURE — 87637 SARSCOV2&INF A&B&RSV AMP PRB: CPT | Performed by: FAMILY MEDICINE

## 2024-04-08 PROCEDURE — 85379 FIBRIN DEGRADATION QUANT: CPT | Performed by: FAMILY MEDICINE

## 2024-04-08 PROCEDURE — 250N000009 HC RX 250: Performed by: FAMILY MEDICINE

## 2024-04-08 PROCEDURE — 99285 EMERGENCY DEPT VISIT HI MDM: CPT | Mod: 25 | Performed by: FAMILY MEDICINE

## 2024-04-08 PROCEDURE — 36415 COLL VENOUS BLD VENIPUNCTURE: CPT | Performed by: FAMILY MEDICINE

## 2024-04-08 PROCEDURE — 85025 COMPLETE CBC W/AUTO DIFF WBC: CPT | Performed by: FAMILY MEDICINE

## 2024-04-08 PROCEDURE — 83880 ASSAY OF NATRIURETIC PEPTIDE: CPT | Performed by: FAMILY MEDICINE

## 2024-04-08 PROCEDURE — 84484 ASSAY OF TROPONIN QUANT: CPT | Performed by: FAMILY MEDICINE

## 2024-04-08 RX ORDER — ALBUTEROL SULFATE 90 UG/1
2 AEROSOL, METERED RESPIRATORY (INHALATION) EVERY 6 HOURS PRN
Qty: 18 G | Refills: 0 | Status: SHIPPED | OUTPATIENT
Start: 2024-04-08

## 2024-04-08 RX ORDER — AZITHROMYCIN 250 MG/1
TABLET, FILM COATED ORAL
Qty: 6 TABLET | Refills: 0 | Status: SHIPPED | OUTPATIENT
Start: 2024-04-08

## 2024-04-08 RX ORDER — IPRATROPIUM BROMIDE AND ALBUTEROL SULFATE 2.5; .5 MG/3ML; MG/3ML
3 SOLUTION RESPIRATORY (INHALATION) ONCE
Status: COMPLETED | OUTPATIENT
Start: 2024-04-08 | End: 2024-04-08

## 2024-04-08 RX ORDER — LIDOCAINE 40 MG/G
CREAM TOPICAL
Status: DISCONTINUED | OUTPATIENT
Start: 2024-04-08 | End: 2024-04-09 | Stop reason: HOSPADM

## 2024-04-08 RX ADMIN — IPRATROPIUM BROMIDE AND ALBUTEROL SULFATE 3 ML: .5; 3 SOLUTION RESPIRATORY (INHALATION) at 21:59

## 2024-04-08 RX ADMIN — SODIUM CHLORIDE 1000 ML: 9 INJECTION, SOLUTION INTRAVENOUS at 20:14

## 2024-04-08 ASSESSMENT — COLUMBIA-SUICIDE SEVERITY RATING SCALE - C-SSRS
2. HAVE YOU ACTUALLY HAD ANY THOUGHTS OF KILLING YOURSELF IN THE PAST MONTH?: NO
1. IN THE PAST MONTH, HAVE YOU WISHED YOU WERE DEAD OR WISHED YOU COULD GO TO SLEEP AND NOT WAKE UP?: NO
6. HAVE YOU EVER DONE ANYTHING, STARTED TO DO ANYTHING, OR PREPARED TO DO ANYTHING TO END YOUR LIFE?: NO

## 2024-04-08 ASSESSMENT — ACTIVITIES OF DAILY LIVING (ADL)
ADLS_ACUITY_SCORE: 35
ADLS_ACUITY_SCORE: 35
ADLS_ACUITY_SCORE: 33
ADLS_ACUITY_SCORE: 35
ADLS_ACUITY_SCORE: 35

## 2024-04-08 ASSESSMENT — ENCOUNTER SYMPTOMS: COUGH: 1

## 2024-04-08 NOTE — ED PROVIDER NOTES
[facility] EMERGENCY DEPARTMENT ([facility])    4/08/24      ED PROVIDER NOTE    History     Chief Complaint   Patient presents with    Cough       Cough    Miguel Madera is a 39 year old female with PMH notable for pneumonia who presents to the ED with cough. She reports 3 days of cough. Endorsing SOB and sharp pain under left breast.  Patient notes for the last week that she has not felt well but cough of the last couple days now some pleuritic pain under the left breast area also mid chest area feels like somebody stabbing her no hemoptysis no productive sputum she is not feeling well no documented fevers though.  No leg pain leg swelling or history of PEs.  No cardiac history otherwise.  Patient feels somewhat short of breath also.  No abdominal pain denies being pregnant otherwise.  No presents valuation.    Past Medical History  Past Medical History:   Diagnosis Date    NO ACTIVE PROBLEMS     Pneumonia      Past Surgical History:   Procedure Laterality Date    No Surgical History       albuterol (PROAIR HFA/PROVENTIL HFA/VENTOLIN HFA) 108 (90 Base) MCG/ACT inhaler  azithromycin (ZITHROMAX Z-KATALINA) 250 MG tablet  acetaminophen (TYLENOL) 500 MG tablet  amoxicillin (AMOXIL) 500 MG tablet  ciprofloxacin-dexamethasone (CIPRODEX) 0.3-0.1 % otic suspension  diclofenac (FLECTOR) 1.3 % patch  docusate sodium (COLACE) 100 MG tablet  ibuprofen (ADVIL/MOTRIN) 200 MG tablet  ibuprofen (ADVIL/MOTRIN) 600 MG tablet  ibuprofen (ADVIL/MOTRIN) 600 MG tablet  methylcellulose (CITRUCEL) 500 MG TABS tablet  neomycin-polymyxin-hydrocortisone (CORTISPORIN) 3.5-59562-8 otic solution  nitroFURantoin macrocrystal-monohydrate (MACROBID) 100 MG capsule  Ondansetron HCl (ZOFRAN PO)  oxyCODONE-acetaminophen (PERCOCET) 5-325 MG per tablet  oxyCODONE-acetaminophen (PERCOCET) 5-325 MG tablet  polyvinyl alcohol (LIQUIFILM TEARS) 1.4 % ophthalmic solution  Prenatal Vit-Fe Fumarate-FA (PRENATAL MULTIVITAMIN  PLUS IRON) 27-0.8 MG  TABS  tiZANidine (ZANAFLEX) 2 MG tablet      No Known Allergies  Family History  History reviewed. No pertinent family history.  Social History   Social History     Tobacco Use    Smoking status: Never    Smokeless tobacco: Never   Substance Use Topics    Alcohol use: No    Drug use: No      Past medical history, past surgical history, medications, allergies, family history, and social history were reviewed with the patient. No additional pertinent items.      A medically appropriate review of systems was performed with pertinent positives and negatives noted in the HPI, and all other systems negative.    Physical Exam   BP: 118/80  Pulse: 101  Temp: 98.6  F (37  C)  Resp: 18  Weight: 100.4 kg (221 lb 4.8 oz)  Physical Exam  Vitals and nursing note reviewed.   Constitutional:       General: She is in acute distress.      Appearance: Normal appearance. She is not toxic-appearing or diaphoretic.      Comments: Patient nontoxic alert and orient x 3 vitally stable here speaking full sentences   HENT:      Head: Atraumatic.      Nose: Congestion present.      Mouth/Throat:      Mouth: Mucous membranes are moist.      Pharynx: Oropharynx is clear.   Eyes:      General: No scleral icterus.     Extraocular Movements: Extraocular movements intact.      Conjunctiva/sclera: Conjunctivae normal.      Pupils: Pupils are equal, round, and reactive to light.   Cardiovascular:      Rate and Rhythm: Normal rate and regular rhythm.      Heart sounds: Normal heart sounds.   Pulmonary:      Effort: No respiratory distress.      Breath sounds: Normal breath sounds. No stridor. No wheezing.   Chest:      Chest wall: Tenderness (left lateral) present.   Abdominal:      General: Abdomen is flat.      Palpations: Abdomen is soft.      Tenderness: There is no abdominal tenderness. There is no guarding.   Musculoskeletal:         General: No swelling or tenderness.      Cervical back: Neck supple. No rigidity or tenderness.      Comments: Neg  homans   Skin:     General: Skin is warm.      Capillary Refill: Capillary refill takes less than 2 seconds.      Findings: No rash.   Neurological:      General: No focal deficit present.      Mental Status: She is alert. Mental status is at baseline.   Psychiatric:      Comments: Appropriate in the ER.           ED Course, Procedures, & Data        Records reviewed in epic.  Patient with history of neck pain evaluation in January this year at Dr. Bonner.  History of otitis externa in August of last year patient history of ear infections previous also.  Medications reviewed allergies reviewed etc.  Here in the ER will get a chest x-ray COVID testing influenza also do D-dimer cardiac etc.    EKG done revealed no hyperacute changes.  Labs drawn reviewed.  IV fluids given also.  BNP and troponin were negative.  D-dimer is negative also influenza COVID RSV were negative.  Sodium 138 potassium 3.5.  CRP is 20.55.  hCG was negative white count 7.1 hemoglobin 12.5.  Liver function is normal limits glucose 105    Chest x-ray done interpreted personally by myself reveals no pneumothorax effusion infiltrate etc.    Patient is no coughing given DuoNeb feeling better at this point more likely bronchitis type symptoms we will treat for atypicals with Z-Carlos Enrique and albuterol inhaler patient treated plan comfortable discharge feeling much better.    Procedures            EKG Interpretation:      Interpreted by Tung Perez MD  Time reviewed: 1939  Symptoms at time of EKG: cp   Rhythm: normal sinus   Rate: normal  Axis: normal  Ectopy: none  Conduction: normal  ST Segments/ T Waves: No ST-T wave changes  Q Waves: none  Comparison to prior: No old EKG available    Clinical Impression: normal EKG          Results for orders placed or performed during the hospital encounter of 04/08/24   XR Chest 2 Views     Status: None    Narrative    EXAM: XR CHEST 2 VIEWS  LOCATION: [facility]  DATE:  4/8/2024    INDICATION: cough with left pleuritic cp  COMPARISON: None.      Impression    IMPRESSION: Heart size and pulmonary vascularity normal. The lungs are clear.   Symptomatic Influenza A/B, RSV, & SARS-CoV2 PCR (COVID-19) Nasopharyngeal     Status: Normal    Specimen: Nasopharyngeal; Swab   Result Value Ref Range    Influenza A PCR Negative Negative    Influenza B PCR Negative Negative    RSV PCR Negative Negative    SARS CoV2 PCR Negative Negative    Narrative    Testing was performed using the Xpert Xpress CoV2/Flu/RSV Assay on the [employer] GeneXpert Instrument. This test should be ordered for the detection of SARS-CoV-2, influenza, and RSV viruses in individuals who meet clinical and/or epidemiological criteria. Test performance is unknown in asymptomatic patients. This test is for in vitro diagnostic use under the FDA EUA for laboratories certified under CLIA to perform high or moderate complexity testing. This test has not been FDA cleared or approved. A negative result does not rule out the presence of PCR inhibitors in the specimen or target RNA in concentration below the limit of detection for the assay. If only one viral target is positive but coinfection with multiple targets is suspected, the sample should be re-tested with another FDA cleared, approved, or authorized test, if coinfection would change clinical management. This test was validated by the [facility] [employer]. These laboratories are certified under the Clinical Laboratory Improvement Amendments of 1988 (CLIA-88) as qualified to perform high complexity laboratory testing.   D dimer quantitative     Status: Normal   Result Value Ref Range    D-Dimer Quantitative 0.32 0.00 - 0.50 ug/mL FEU    Narrative    This D-dimer assay is intended for use in conjunction with a clinical pretest probability assessment model to exclude pulmonary embolism (PE) and deep venous thrombosis (DVT) in outpatients suspected of PE or DVT. The cut-off  value is 0.50 ug/mL FEU.   CRP inflammation     Status: Abnormal   Result Value Ref Range    CRP Inflammation 20.55 (H) <5.00 mg/L   Comprehensive metabolic panel     Status: Abnormal   Result Value Ref Range    Sodium 138 135 - 145 mmol/L    Potassium 3.5 3.4 - 5.3 mmol/L    Carbon Dioxide (CO2) 24 22 - 29 mmol/L    Anion Gap 11 7 - 15 mmol/L    Urea Nitrogen 10.2 6.0 - 20.0 mg/dL    Creatinine 0.58 0.51 - 0.95 mg/dL    GFR Estimate >90 >60 mL/min/1.73m2    Calcium 8.7 8.6 - 10.0 mg/dL    Chloride 103 98 - 107 mmol/L    Glucose 105 (H) 70 - 99 mg/dL    Alkaline Phosphatase 57 40 - 150 U/L    AST 16 0 - 45 U/L    ALT 11 0 - 50 U/L    Protein Total 6.7 6.4 - 8.3 g/dL    Albumin 3.8 3.5 - 5.2 g/dL    Bilirubin Total 0.2 <=1.2 mg/dL   Magnesium     Status: Normal   Result Value Ref Range    Magnesium 2.3 1.7 - 2.3 mg/dL   Troponin T, High Sensitivity     Status: Normal   Result Value Ref Range    Troponin T, High Sensitivity <6 <=14 ng/L   Nt probnp inpatient (BNP)     Status: Normal   Result Value Ref Range    N terminal Pro BNP Inpatient 49 0 - 450 pg/mL   HCG qualitative Blood     Status: Normal   Result Value Ref Range    hCG Serum Qualitative Negative Negative   Symptomatic Influenza A/B, RSV, & SARS-CoV2 PCR (COVID-19) Nasopharyngeal     Status: Normal    Specimen: Nasopharyngeal; Swab   Result Value Ref Range    Influenza A PCR Negative Negative    Influenza B PCR Negative Negative    RSV PCR Negative Negative    SARS CoV2 PCR Negative Negative    Narrative    Testing was performed using the Xpert Xpress CoV2/Flu/RSV Assay on the [employer] GeneXpert Instrument. This test should be ordered for the detection of SARS-CoV-2, influenza, and RSV viruses in individuals who meet clinical and/or epidemiological criteria. Test performance is unknown in asymptomatic patients. This test is for in vitro diagnostic use under the FDA EUA for laboratories certified under CLIA to perform high or moderate complexity testing. This  test has not been FDA cleared or approved. A negative result does not rule out the presence of PCR inhibitors in the specimen or target RNA in concentration below the limit of detection for the assay. If only one viral target is positive but coinfection with multiple targets is suspected, the sample should be re-tested with another FDA cleared, approved, or authorized test, if coinfection would change clinical management. This test was validated by the [facility] [employer]. These laboratories are certified under the Clinical Laboratory Improvement Amendments of 1988 (CLIA-88) as qualified to perform high complexity laboratory testing.   CBC with platelets and differential     Status: None   Result Value Ref Range    WBC Count 7.1 4.0 - 11.0 10e3/uL    RBC Count 4.39 3.80 - 5.20 10e6/uL    Hemoglobin 12.5 11.7 - 15.7 g/dL    Hematocrit 37.5 35.0 - 47.0 %    MCV 85 78 - 100 fL    MCH 28.5 26.5 - 33.0 pg    MCHC 33.3 31.5 - 36.5 g/dL    RDW 13.0 10.0 - 15.0 %    Platelet Count 283 150 - 450 10e3/uL    % Neutrophils 39 %    % Lymphocytes 53 %    % Monocytes 4 %    % Eosinophils 4 %    % Basophils 0 %    % Immature Granulocytes 0 %    NRBCs per 100 WBC 0 <1 /100    Absolute Neutrophils 2.8 1.6 - 8.3 10e3/uL    Absolute Lymphocytes 3.7 0.8 - 5.3 10e3/uL    Absolute Monocytes 0.3 0.0 - 1.3 10e3/uL    Absolute Eosinophils 0.3 0.0 - 0.7 10e3/uL    Absolute Basophils 0.0 0.0 - 0.2 10e3/uL    Absolute Immature Granulocytes 0.0 <=0.4 10e3/uL    Absolute NRBCs 0.0 10e3/uL   CBC with platelets differential     Status: None    Narrative    The following orders were created for panel order CBC with platelets differential.  Procedure                               Abnormality         Status                     ---------                               -----------         ------                     CBC with platelets and d...[[id number]]                      Final result                 Please view results for these tests on  the individual orders.     Medications   sodium chloride 0.9% BOLUS 1,000 mL (0 mLs Intravenous Stopped 4/8/24 2241)   ipratropium - albuterol 0.5 mg/2.5 mg/3 mL (DUONEB) neb solution 3 mL (3 mLs Nebulization $Given 4/8/24 6585)     Labs Ordered and Resulted from Time of ED Arrival to Time of ED Departure   CRP INFLAMMATION - Abnormal       Result Value    CRP Inflammation 20.55 (*)    COMPREHENSIVE METABOLIC PANEL - Abnormal    Sodium 138      Potassium 3.5      Carbon Dioxide (CO2) 24      Anion Gap 11      Urea Nitrogen 10.2      Creatinine 0.58      GFR Estimate >90      Calcium 8.7      Chloride 103      Glucose 105 (*)     Alkaline Phosphatase 57      AST 16      ALT 11      Protein Total 6.7      Albumin 3.8      Bilirubin Total 0.2     INFLUENZA A/B, RSV, & SARS-COV2 PCR - Normal    Influenza A PCR Negative      Influenza B PCR Negative      RSV PCR Negative      SARS CoV2 PCR Negative     D DIMER QUANTITATIVE - Normal    D-Dimer Quantitative 0.32     MAGNESIUM - Normal    Magnesium 2.3     TROPONIN T, HIGH SENSITIVITY - Normal    Troponin T, High Sensitivity <6     NT PROBNP INPATIENT - Normal    N terminal Pro BNP Inpatient 49     HCG QUALITATIVE PREGNANCY - Normal    hCG Serum Qualitative Negative     CBC WITH PLATELETS AND DIFFERENTIAL    WBC Count 7.1      RBC Count 4.39      Hemoglobin 12.5      Hematocrit 37.5      MCV 85      MCH 28.5      MCHC 33.3      RDW 13.0      Platelet Count 283      % Neutrophils 39      % Lymphocytes 53      % Monocytes 4      % Eosinophils 4      % Basophils 0      % Immature Granulocytes 0      NRBCs per 100 WBC 0      Absolute Neutrophils 2.8      Absolute Lymphocytes 3.7      Absolute Monocytes 0.3      Absolute Eosinophils 0.3      Absolute Basophils 0.0      Absolute Immature Granulocytes 0.0      Absolute NRBCs 0.0       XR Chest 2 Views   Final Result   IMPRESSION: Heart size and pulmonary vascularity normal. The lungs are clear.             Critical care was not  performed.     Medical Decision Making  The patient's presentation was of moderate complexity (an acute illness with systemic symptoms).    The patient's evaluation involved:  review of external note(s) from 3+ sources (see separate area of note for details)  review of 3+ test result(s) ordered prior to this encounter (see separate area of note for details)  ordering and/or review of 3+ test(s) in this encounter (see separate area of note for details)    The patient's management necessitated moderate risk (prescription drug management including medications given in the ED).    Assessment & Plan   39-year-old female presents with a week of ongoing cough has had history pneumonia past before.  Here evaluated has some left-sided pleuritic chest pain but D-dimer within normal limits.  Chest x-ray negative for pneumothorax EKG without ischemic changes cardiac workup otherwise negative COVID influenza RSV are negative.  Patient here received a DuoNeb feeling better at this point will be treated for bronchitis with bronchospasm cough.  Albuterol inhaler and Z-Katalina given patient follow-up with MD return if worsening symptoms agrees with plan.       I have reviewed the nursing notes. I have reviewed the findings, diagnosis, plan and need for follow up with the patient.    Discharge Medication List as of 4/8/2024 10:59 PM        START taking these medications    Details   albuterol (PROAIR HFA/PROVENTIL HFA/VENTOLIN HFA) 108 (90 Base) MCG/ACT inhaler Inhale 2 puffs into the lungs every 6 hours as needed for shortness of breath, wheezing or cough, Disp-18 g, R-0, [facility] may dispense brand covered by insurance (Proair, or proventil or ventolin or generic albuterol inhaler)      azithromycin (ZITHROMAX Z-KATALINA) 250 MG tablet Take as directed for bronchitis, Disp-6 tablet, R-0, Local Print             Final diagnoses:   Acute bronchitis, unspecified organism       Tung Perez  [facility] EMERGENCY  DEPARTMENT  4/8/2024    This note was created at least in part by the use of dragon voice dictation system. Inadvertent typographical errors may still exist.  Tung Perez MD.  Patient evaluated in the emergency department during the COVID-19 pandemic period. Careful attention to patients safety was addressed throughout the evaluation. Evaluation and treatment management was initiated with disposition made efficiently and appropriate as possible to minimize any risk of potential exposure to patient during this evaluation.       Tung Perez MD  04/09/24 0242

## 2024-04-09 NOTE — DISCHARGE INSTRUCTIONS
Home.  Xray and tests look good.  No covid or influenza.,  More likely bronchitis.  Take the z tamia for infection.  Use the albuterol inhaler for cough and breathing.  Honey for cough also.  See MD for a recheck.  Return if any concerns.    Results for orders placed or performed during the hospital encounter of 04/08/24   XR Chest 2 Views     Status: None    Narrative    EXAM: XR CHEST 2 VIEWS  LOCATION: [facility]  DATE: 4/8/2024    INDICATION: cough with left pleuritic cp  COMPARISON: None.      Impression    IMPRESSION: Heart size and pulmonary vascularity normal. The lungs are clear.   Symptomatic Influenza A/B, RSV, & SARS-CoV2 PCR (COVID-19) Nasopharyngeal     Status: Normal    Specimen: Nasopharyngeal; Swab   Result Value Ref Range    Influenza A PCR Negative Negative    Influenza B PCR Negative Negative    RSV PCR Negative Negative    SARS CoV2 PCR Negative Negative    Narrative    Testing was performed using the Xpert Xpress CoV2/Flu/RSV Assay on the [employer] GeneXpert Instrument. This test should be ordered for the detection of SARS-CoV-2, influenza, and RSV viruses in individuals who meet clinical and/or epidemiological criteria. Test performance is unknown in asymptomatic patients. This test is for in vitro diagnostic use under the FDA EUA for laboratories certified under CLIA to perform high or moderate complexity testing. This test has not been FDA cleared or approved. A negative result does not rule out the presence of PCR inhibitors in the specimen or target RNA in concentration below the limit of detection for the assay. If only one viral target is positive but coinfection with multiple targets is suspected, the sample should be re-tested with another FDA cleared, approved, or authorized test, if coinfection would change clinical management. This test was validated by the [facility] [employer]. These laboratories are certified under the Clinical  Laboratory Improvement Amendments of 1988 (CLIA-88) as qualified to perform high complexity laboratory testing.   D dimer quantitative     Status: Normal   Result Value Ref Range    D-Dimer Quantitative 0.32 0.00 - 0.50 ug/mL FEU    Narrative    This D-dimer assay is intended for use in conjunction with a clinical pretest probability assessment model to exclude pulmonary embolism (PE) and deep venous thrombosis (DVT) in outpatients suspected of PE or DVT. The cut-off value is 0.50 ug/mL FEU.   CRP inflammation     Status: Abnormal   Result Value Ref Range    CRP Inflammation 20.55 (H) <5.00 mg/L   Comprehensive metabolic panel     Status: Abnormal   Result Value Ref Range    Sodium 138 135 - 145 mmol/L    Potassium 3.5 3.4 - 5.3 mmol/L    Carbon Dioxide (CO2) 24 22 - 29 mmol/L    Anion Gap 11 7 - 15 mmol/L    Urea Nitrogen 10.2 6.0 - 20.0 mg/dL    Creatinine 0.58 0.51 - 0.95 mg/dL    GFR Estimate >90 >60 mL/min/1.73m2    Calcium 8.7 8.6 - 10.0 mg/dL    Chloride 103 98 - 107 mmol/L    Glucose 105 (H) 70 - 99 mg/dL    Alkaline Phosphatase 57 40 - 150 U/L    AST 16 0 - 45 U/L    ALT 11 0 - 50 U/L    Protein Total 6.7 6.4 - 8.3 g/dL    Albumin 3.8 3.5 - 5.2 g/dL    Bilirubin Total 0.2 <=1.2 mg/dL   Magnesium     Status: Normal   Result Value Ref Range    Magnesium 2.3 1.7 - 2.3 mg/dL   Troponin T, High Sensitivity     Status: Normal   Result Value Ref Range    Troponin T, High Sensitivity <6 <=14 ng/L   Nt probnp inpatient (BNP)     Status: Normal   Result Value Ref Range    N terminal Pro BNP Inpatient 49 0 - 450 pg/mL   HCG qualitative Blood     Status: Normal   Result Value Ref Range    hCG Serum Qualitative Negative Negative   CBC with platelets and differential     Status: None   Result Value Ref Range    WBC Count 7.1 4.0 - 11.0 10e3/uL    RBC Count 4.39 3.80 - 5.20 10e6/uL    Hemoglobin 12.5 11.7 - 15.7 g/dL    Hematocrit 37.5 35.0 - 47.0 %    MCV 85 78 - 100 fL    MCH 28.5 26.5 - 33.0 pg    MCHC 33.3 31.5 - 36.5  g/dL    RDW 13.0 10.0 - 15.0 %    Platelet Count 283 150 - 450 10e3/uL    % Neutrophils 39 %    % Lymphocytes 53 %    % Monocytes 4 %    % Eosinophils 4 %    % Basophils 0 %    % Immature Granulocytes 0 %    NRBCs per 100 WBC 0 <1 /100    Absolute Neutrophils 2.8 1.6 - 8.3 10e3/uL    Absolute Lymphocytes 3.7 0.8 - 5.3 10e3/uL    Absolute Monocytes 0.3 0.0 - 1.3 10e3/uL    Absolute Eosinophils 0.3 0.0 - 0.7 10e3/uL    Absolute Basophils 0.0 0.0 - 0.2 10e3/uL    Absolute Immature Granulocytes 0.0 <=0.4 10e3/uL    Absolute NRBCs 0.0 10e3/uL   CBC with platelets differential     Status: None    Narrative    The following orders were created for panel order CBC with platelets differential.  Procedure                               Abnormality         Status                     ---------                               -----------         ------                     CBC with platelets and d...[[id number]]                      Final result                 Please view results for these tests on the individual orders.

## 2025-03-26 ENCOUNTER — HOSPITAL ENCOUNTER (EMERGENCY)
Facility: CLINIC | Age: 40
Discharge: HOME | End: 2025-03-26
Attending: EMERGENCY MEDICINE | Admitting: EMERGENCY MEDICINE
Payer: COMMERCIAL

## 2025-03-26 VITALS
HEART RATE: 68 BPM | RESPIRATION RATE: 16 BRPM | SYSTOLIC BLOOD PRESSURE: 104 MMHG | TEMPERATURE: 97.7 F | OXYGEN SATURATION: 98 % | DIASTOLIC BLOOD PRESSURE: 72 MMHG

## 2025-03-26 DIAGNOSIS — J02.9 SORE THROAT: ICD-10-CM

## 2025-03-26 DIAGNOSIS — J02.0 STREP PHARYNGITIS: Primary | ICD-10-CM

## 2025-03-26 LAB
FLUAV RNA SPEC QL NAA+PROBE: NEGATIVE
FLUBV RNA RESP QL NAA+PROBE: NEGATIVE
RSV RNA SPEC NAA+PROBE: NEGATIVE
S PYO DNA THROAT QL NAA+PROBE: NOT DETECTED
SARS-COV-2 RNA RESP QL NAA+PROBE: NEGATIVE

## 2025-03-26 PROCEDURE — 87651 STREP A DNA AMP PROBE: CPT | Performed by: EMERGENCY MEDICINE

## 2025-03-26 PROCEDURE — 99283 EMERGENCY DEPT VISIT LOW MDM: CPT

## 2025-03-26 PROCEDURE — 250N000009 HC RX 250: Performed by: EMERGENCY MEDICINE

## 2025-03-26 PROCEDURE — 87637 SARSCOV2&INF A&B&RSV AMP PRB: CPT | Performed by: EMERGENCY MEDICINE

## 2025-03-26 RX ORDER — ACETAMINOPHEN 500 MG
1000 TABLET ORAL EVERY 6 HOURS PRN
COMMUNITY
Start: 2025-03-26

## 2025-03-26 RX ORDER — DEXAMETHASONE SODIUM PHOSPHATE 10 MG/ML
10 INJECTION, SOLUTION INTRAMUSCULAR; INTRAVENOUS ONCE
Status: COMPLETED | OUTPATIENT
Start: 2025-03-26 | End: 2025-03-26

## 2025-03-26 RX ORDER — IBUPROFEN 600 MG/1
600 TABLET, FILM COATED ORAL EVERY 6 HOURS PRN
COMMUNITY
Start: 2025-03-26

## 2025-03-26 RX ADMIN — DEXAMETHASONE SODIUM PHOSPHATE 10 MG: 10 INJECTION, SOLUTION INTRAMUSCULAR; INTRAVENOUS at 12:47

## 2025-03-26 ASSESSMENT — ACTIVITIES OF DAILY LIVING (ADL): ADLS_ACUITY_SCORE: 41

## 2025-03-26 NOTE — ED TRIAGE NOTES
Pt c/o sore throat and headache for 10 days, ABCD intact.       Triage Assessment (Adult)       Row Name 03/26/25 3728          Triage Assessment    Airway WDL WDL        Respiratory WDL    Respiratory WDL WDL        Skin Circulation/Temperature WDL    Skin Circulation/Temperature WDL WDL        Cardiac WDL    Cardiac WDL WDL        Peripheral/Neurovascular WDL    Peripheral Neurovascular WDL WDL        Cognitive/Neuro/Behavioral WDL    Cognitive/Neuro/Behavioral WDL WDL

## 2025-03-26 NOTE — ED PROVIDER NOTES
Emergency Department Note      History of Present Illness     Chief Complaint   Pharyngitis      HPI   Miguel Madera is a 40 year old female here for evaluation of pharyngitis. Patient reports headache, sore throat and cough for the past four days. She has not been taking any medication for this. She has multiple children at home who are currently sick with strep and are currently taking antibiotics.     Independent Historian   None    Review of External Notes   None    Past Medical History     Medical History and Problem List   Pneumonia     Medications   The patient is currently on no regular medications.     Physical Exam     Patient Vitals for the past 24 hrs:   BP Temp Temp src Pulse Resp SpO2   03/26/25 1159 104/72 -- -- -- -- --   03/26/25 1157 (!) 87/65 97.7  F (36.5  C) Temporal 68 16 98 %   87/65 (erroneous entry) - no hypotension while in ED.    Physical Exam  General: Alert, appears well-developed and well-nourished. Cooperative.     In mild distress  HEENT:  Head:  Atraumatic  Ears:  External ears are normal  Mouth/Throat:  Oropharynx is without erythema or exudate and mucous membranes are moist. Mild tonsillar erythema with no exudates.    Eyes:   Conjunctivae normal and EOM are normal. No scleral icterus.  Neck:  Normal range of motion, no masses detected.  No meningismus.  CV:  Normal rate, regular rhythm, normal heart sounds and radial pulses are 2+ and symmetric.  No murmur.  Resp:  Breath sounds are clear bilaterally    Non-labored, no retractions or accessory muscle use  GI:  Abdomen is soft, no distension, no tenderness. No rebound or guarding.  No CVA tenderness bilaterally  MS:  Normal range of motion. No edema.    No chest wall tenderness to palpation.    Normal strength in all 4 extremities.     Back atraumatic.    No midline cervical, thoracic, or lumbar tenderness  Skin:  Warm and dry.  No rash or lesions noted.  Neuro:   Alert. Normal strength.  GCS: 15  Psych: Normal mood and  affect.  Lymph: Faint anterior cervical lymphadenopathy on the left side.    Diagnostics     Lab Results   Labs Ordered and Resulted from Time of ED Arrival to Time of ED Departure   INFLUENZA A/B, RSV AND SARS-COV2 PCR - Normal       Result Value    Influenza A PCR Negative      Influenza B PCR Negative      RSV PCR Negative      SARS CoV2 PCR Negative     GROUP A STREPTOCOCCUS PCR THROAT SWAB - Normal    Group A strep by PCR Not Detected         Imaging   No orders to display       Independent Interpretation   None    ED Course      Medications Administered   Medications   dexAMETHasone (PF) (DECADRON) injectable solution used ORALLY 10 mg (10 mg Oral $Given 3/26/25 1247)       Procedures   Procedures     Discussion of Management   None    ED Course   ED Course as of 03/26/25 1301   Wed Mar 26, 2025   1239 I obtained history and examined the patient as noted above.        Additional Documentation  None    Medical Decision Making / Diagnosis     CMS Diagnoses: None    MIPS       None    MDM   Miguel Madera is a 40 year old female who presents with sore throat and clinical evidence of pharyngitis. Testing came back negative, however she had exposure to multiple children at home who have currently have strep - she also has tender anterior cervical lymphnodes, minimal cough, and sore throat. I see no clinical evidence of  peritonsillar abscess, retropharyngeal abscess, Lemierre's Syndrome, epiglottis, or Shaheen's angina. The patient's symptoms are consistent with streptococcal pharyngitis.  Decadron given in ED for one time dose medication to assist with throat pain.  I have recommended treatment with antibiotics and analgesics.  Return if increasing pain, change in voice, neck pain, vomiting, fever, or shortness of breath.  Follow-up with primary physician if not improving in 5-7 days.    Disposition   The patient was discharged.     Diagnosis     ICD-10-CM    1. Strep pharyngitis  J02.0       2. Sore throat  J02.9             Discharge Medications   Discharge Medication List as of 3/26/2025 12:44 PM        START taking these medications    Details   amoxicillin-clavulanate (AUGMENTIN) 875-125 MG tablet Take 1 tablet by mouth 2 times daily for 10 days., Disp-20 tablet, R-0, E-Prescribe               Scribe Disclosure:  I, Sarika Reg, am serving as a scribe at 12:39 PM on 3/26/2025 to document services personally performed by Duong Hudson MD based on my observations and the provider's statements to me.        Duong Hudson MD  03/26/25 1710

## 2025-03-26 NOTE — DISCHARGE INSTRUCTIONS
Even though your strep test came back negative you have multiple sick contacts at home with strep pharyngitis.  We will still plan to start you on antibiotics and you received a single dose of steroids here in the emergency department.  Please follow-up with your primary care doctor if you are not improving in the next 5 to 7 days.

## 2025-05-12 ENCOUNTER — HOSPITAL ENCOUNTER (EMERGENCY)
Facility: CLINIC | Age: 40
Discharge: HOME | End: 2025-05-12
Admitting: PHYSICIAN ASSISTANT
Payer: COMMERCIAL

## 2025-05-12 VITALS
TEMPERATURE: 98.7 F | BODY MASS INDEX: 33.99 KG/M2 | WEIGHT: 217 LBS | RESPIRATION RATE: 16 BRPM | OXYGEN SATURATION: 98 % | DIASTOLIC BLOOD PRESSURE: 81 MMHG | SYSTOLIC BLOOD PRESSURE: 115 MMHG | HEART RATE: 94 BPM

## 2025-05-12 DIAGNOSIS — H66.90 OTITIS MEDIA: ICD-10-CM

## 2025-05-12 PROCEDURE — 99283 EMERGENCY DEPT VISIT LOW MDM: CPT | Performed by: PHYSICIAN ASSISTANT

## 2025-05-12 PROCEDURE — 99284 EMERGENCY DEPT VISIT MOD MDM: CPT | Performed by: PHYSICIAN ASSISTANT

## 2025-05-12 RX ORDER — OFLOXACIN 3 MG/ML
5 SOLUTION AURICULAR (OTIC) 2 TIMES DAILY
Qty: 5 ML | Refills: 0 | Status: SHIPPED | OUTPATIENT
Start: 2025-05-12 | End: 2025-05-19

## 2025-05-12 RX ORDER — AMOXICILLIN 875 MG/1
875 TABLET, COATED ORAL 2 TIMES DAILY
Qty: 10 TABLET | Refills: 0 | Status: SHIPPED | OUTPATIENT
Start: 2025-05-12 | End: 2025-05-17

## 2025-05-12 ASSESSMENT — ACTIVITIES OF DAILY LIVING (ADL): ADLS_ACUITY_SCORE: 41

## 2025-05-12 NOTE — ED PROVIDER NOTES
[facility] EMERGENCY DEPARTMENT ([facility])    5/12/25      ED PROVIDER NOTE   History     Chief Complaint   Patient presents with    Otalgia     Bilateral, past two days.     HPI  39yo F prior ear infections p/w b/l (R>>L) otalgia x few days.  Endorses rhinorrhea, but otherwise denies URI symptoms, HA, fever, chills.  Taking APAP at home for pain.      Past Medical History  Past Medical History:   Diagnosis Date    NO ACTIVE PROBLEMS     Pneumonia      Past Surgical History:   Procedure Laterality Date    No Surgical History       acetaminophen (TYLENOL) 500 MG tablet  ofloxacin (FLOXIN) 0.3 % otic solution  acetaminophen (TYLENOL) 500 MG tablet  albuterol (PROAIR HFA/PROVENTIL HFA/VENTOLIN HFA) 108 (90 Base) MCG/ACT inhaler  amoxicillin (AMOXIL) 875 MG tablet  azithromycin (ZITHROMAX Z-KATALINA) 250 MG tablet  ciprofloxacin-dexamethasone (CIPRODEX) 0.3-0.1 % otic suspension  diclofenac (FLECTOR) 1.3 % patch  docusate sodium (COLACE) 100 MG tablet  ibuprofen (ADVIL/MOTRIN) 200 MG tablet  ibuprofen (ADVIL/MOTRIN) 600 MG tablet  ibuprofen (ADVIL/MOTRIN) 600 MG tablet  ibuprofen (ADVIL/MOTRIN) 600 MG tablet  methylcellulose (CITRUCEL) 500 MG TABS tablet  neomycin-polymyxin-hydrocortisone (CORTISPORIN) 3.5-66735-7 otic solution  nitroFURantoin macrocrystal-monohydrate (MACROBID) 100 MG capsule  Ondansetron HCl (ZOFRAN PO)  oxyCODONE-acetaminophen (PERCOCET) 5-325 MG per tablet  oxyCODONE-acetaminophen (PERCOCET) 5-325 MG tablet  polyvinyl alcohol (LIQUIFILM TEARS) 1.4 % ophthalmic solution  Prenatal Vit-Fe Fumarate-FA (PRENATAL MULTIVITAMIN  PLUS IRON) 27-0.8 MG TABS  tiZANidine (ZANAFLEX) 2 MG tablet      No Known Allergies  Family History  No family history on file.  Social History   Social History     Tobacco Use    Smoking status: Never    Smokeless tobacco: Never   Substance Use Topics    Alcohol use: No    Drug use: No      A medically appropriate review of systems was performed with pertinent positives and  negatives noted in the HPI, and all other systems negative.    Physical Exam   BP: 115/81  Pulse: 94  Temp: 98.7  F (37.1  C)  Resp: 16  Weight: 98.4 kg (217 lb)  SpO2: 98 %/81   Pulse 94   Temp 98.7  F (37.1  C) (Oral)   Resp 16   Wt 98.4 kg (217 lb)   SpO2 98%   BMI 33.99 kg/m    Physical Exam  Constitutional:       General: She is not in acute distress.     Appearance: Normal appearance. She is not diaphoretic.   HENT:      Head: Atraumatic.      Right Ear: A middle ear effusion is present. Tympanic membrane is erythematous and bulging.      Left Ear: Tympanic membrane is erythematous.      Mouth/Throat:      Mouth: Mucous membranes are moist.   Eyes:      Conjunctiva/sclera: Conjunctivae normal.   Cardiovascular:      Rate and Rhythm: Normal rate.   Pulmonary:      Effort: No respiratory distress.   Abdominal:      General: Abdomen is flat.   Musculoskeletal:      Cervical back: Neck supple.   Lymphadenopathy:      Head:      Right side of head: Preauricular adenopathy present.      Left side of head: Preauricular adenopathy present.   Skin:     General: Skin is warm.   Neurological:      Mental Status: She is alert.           ED Course, Procedures, & Data      Procedures                No results found for any visits on 05/12/25.  Medications - No data to display  Labs Ordered and Resulted from Time of ED Arrival to Time of ED Departure - No data to display  No orders to display          Critical care was not performed.     Medical Decision Making  The patient's presentation was of low complexity (an acute and uncomplicated illness or injury).    The patient's evaluation involved:  history and exam without other MDM data elements    The patient's management necessitated moderate risk (prescription drug management including medications given in the ED).    Assessment & Plan    41yo F prior ear infections p/w b/l (R>>L) otalgia x few days.  No fever or chills.  In ED patient is HDS/AF.  Her exam shows  a significantly erythematous, bulging TM on right with air-fluid line.  Left is erythematous but not as impressive is right.  She has questionable otitis externa component on right.  Patient will be treated with amoxicillin and ofloxacin otic drops.  No mastoid tenderness to suggest mastoiditis.  Patient discharged with PCP follow-up and strict ER return precautions worsening symptoms.    I have reviewed the nursing notes. I have reviewed the findings, diagnosis, plan and need for follow up with the patient.    New Prescriptions    AMOXICILLIN (AMOXIL) 875 MG TABLET    Take 1 tablet (875 mg) by mouth 2 times daily for 5 days.    OFLOXACIN (FLOXIN) 0.3 % OTIC SOLUTION    Place 5 drops in ear(s) 2 times daily for 7 days.       Final diagnoses:   Otitis media         Sushant Gill PA-C  [facility] EMERGENCY DEPARTMENT  5/12/2025     Sushant Gill PA-C  05/12/25 141

## 2025-05-12 NOTE — DISCHARGE INSTRUCTIONS
Please make an appointment to follow up with Your Primary Care Provider and Primary Care Center (phone: [phone]).  Return to the ER for worsening symptoms.

## 2025-05-12 NOTE — ED TRIAGE NOTES
Triage Assessment (Adult)       Row Name 05/12/25 1232          Triage Assessment    Airway WDL WDL        Respiratory WDL    Respiratory WDL WDL        Skin Circulation/Temperature WDL    Skin Circulation/Temperature WDL WDL        Cardiac WDL    Cardiac WDL WDL        Peripheral/Neurovascular WDL    Peripheral Neurovascular WDL WDL        Cognitive/Neuro/Behavioral WDL    Cognitive/Neuro/Behavioral WDL WDL

## (undated) DEVICE — TUBING SUCTION 12"X1/4" N612

## (undated) DEVICE — SUCTION CANISTER MEDIVAC LINER 3000ML W/LID 65651-530

## (undated) DEVICE — PACK TVT HYSTEROSCOPY SMA15HYFSE

## (undated) DEVICE — TUBING IRRIG TUR Y TYPE 96" LF 6543-01

## (undated) DEVICE — SOL NACL 0.9% INJ 1000ML BAG 2B1324X

## (undated) DEVICE — GLOVE PROTEXIS MICRO 7.5  2D73PM75

## (undated) DEVICE — LINEN TOWEL PACK X5 5464

## (undated) DEVICE — SOL WATER IRRIG 1000ML BOTTLE 2F7114

## (undated) RX ORDER — CEFAZOLIN SODIUM 2 G/100ML
INJECTION, SOLUTION INTRAVENOUS
Status: DISPENSED
Start: 2019-04-04

## (undated) RX ORDER — FENTANYL CITRATE 50 UG/ML
INJECTION, SOLUTION INTRAMUSCULAR; INTRAVENOUS
Status: DISPENSED
Start: 2019-04-04

## (undated) RX ORDER — KETOROLAC TROMETHAMINE 30 MG/ML
INJECTION, SOLUTION INTRAMUSCULAR; INTRAVENOUS
Status: DISPENSED
Start: 2019-04-04